# Patient Record
Sex: FEMALE | Race: WHITE | NOT HISPANIC OR LATINO | Employment: FULL TIME | URBAN - METROPOLITAN AREA
[De-identification: names, ages, dates, MRNs, and addresses within clinical notes are randomized per-mention and may not be internally consistent; named-entity substitution may affect disease eponyms.]

---

## 2018-02-15 ENCOUNTER — TRANSFERRED RECORDS (OUTPATIENT)
Dept: HEALTH INFORMATION MANAGEMENT | Facility: CLINIC | Age: 23
End: 2018-02-15

## 2018-02-23 RX ORDER — DROSPIRENONE AND ETHINYL ESTRADIOL 0.02-3(28)
1 KIT ORAL DAILY
COMMUNITY
End: 2018-03-13

## 2018-03-13 ENCOUNTER — OFFICE VISIT (OUTPATIENT)
Dept: OBGYN | Facility: OTHER | Age: 23
End: 2018-03-13
Attending: OBSTETRICS & GYNECOLOGY
Payer: COMMERCIAL

## 2018-03-13 VITALS
SYSTOLIC BLOOD PRESSURE: 105 MMHG | BODY MASS INDEX: 20.09 KG/M2 | OXYGEN SATURATION: 99 % | DIASTOLIC BLOOD PRESSURE: 71 MMHG | WEIGHT: 125 LBS | HEIGHT: 66 IN | HEART RATE: 115 BPM

## 2018-03-13 DIAGNOSIS — R10.2 PELVIC PAIN IN FEMALE: Primary | ICD-10-CM

## 2018-03-13 DIAGNOSIS — N94.6 DYSMENORRHEA: ICD-10-CM

## 2018-03-13 DIAGNOSIS — R30.0 DYSURIA: ICD-10-CM

## 2018-03-13 DIAGNOSIS — N94.10 DYSPAREUNIA, FEMALE: ICD-10-CM

## 2018-03-13 PROCEDURE — 99243 OFF/OP CNSLTJ NEW/EST LOW 30: CPT | Performed by: OBSTETRICS & GYNECOLOGY

## 2018-03-13 RX ORDER — METRONIDAZOLE 500 MG/1
500 TABLET ORAL DAILY PRN
COMMUNITY
Start: 2018-02-03 | End: 2018-05-03

## 2018-03-13 ASSESSMENT — PAIN SCALES - GENERAL: PAINLEVEL: NO PAIN (0)

## 2018-03-13 NOTE — MR AVS SNAPSHOT
After Visit Summary   3/13/2018    Ketty Mercedes    MRN: [id number]           Patient Information     Date Of Birth          1995        Visit Information        Provider Department      3/13/2018 2:00 PM Timoteo Weber MD [facility]        Today's Diagnoses     Pelvic pain in female    -  1    Dysmenorrhea        Dyspareunia, female        Dysuria          Care Instructions     Pt has my card and phone number to call as needed if problems in the interim or she does not hear her results.           Follow-ups after your visit        Your next 10 appointments already scheduled     Mar 20, 2018  1:00 PM CDT   US PELVIC COMPLETE W TRANSVAGINAL with HIUS1   HI ULTRASOUND ([facility] )    [address] [facility] [address]   [phone]           Please bring a list of your medicines (including vitamins, minerals and over-the-counter drugs). Also, tell your doctor about any allergies you may have. Wear comfortable clothes and leave your valuables at home.  Adults: Drink six 8-ounce glasses of fluid one hour before your exam. Do NOT empty your bladder.  If you need to empty your bladder before your exam, try to release only a little bit of urine. Then, drink another 8oz glass of fluid.  Children: Children who are potty trained should drink at least 4 cups (32 oz) of liquid 45 minutes to one hour prior to the exam. The child s bladder must be full in order to achieve a diagnostic exam. If your child is very uncomfortable or has an urgent need to pee, please notify a technologist; they will try to find out how much longer the wait may be and provide instructions to help relieve the pressure. Occasionally it is medically necessary to insert a urinary catheter to fill the bladder.  Please call the Imaging Department at your exam site with any questions.              Who to contact     If you have questions or need follow up information about today's clinic visit or your  "schedule please contact [facility] directly at [phone].  Normal or non-critical lab and imaging results will be communicated to you by MyChart, letter or phone within 4 business days after the clinic has received the results. If you do not hear from us within 7 days, please contact the clinic through MyChart or phone. If you have a critical or abnormal lab result, we will notify you by phone as soon as possible.  Submit refill requests through [employer] or call your pharmacy and they will forward the refill request to us. Please allow 3 business days for your refill to be completed.          Additional Information About Your Visit        [employer] Information     [employer] lets you send messages to your doctor, view your test results, renew your prescriptions, schedule appointments and more. To sign up, go to [email or web address] . Click on \"Log in\" on the left side of the screen, which will take you to the Welcome page. Then click on \"Sign up Now\" on the right side of the page.     You will be asked to enter the access code listed below, as well as some personal information. Please follow the directions to create your username and password.     Your access code is: [email or web address]  Expires: 2018  8:53 PM     Your access code will  in 90 days. If you need help or a new code, please call your [facility] clinic or [phone].        Care EveryWhere ID     This is your Care EveryWhere ID. This could be used by other organizations to access your [facility] medical records  [id number]        Your Vitals Were     Pulse Height Pulse Oximetry BMI (Body Mass Index)          115 5' 6\" (1.676 m) 99% 20.18 kg/m2         Blood Pressure from Last 3 Encounters:   18 105/71   16 130/90   16 109/77    Weight from Last 3 Encounters:   18 125 lb (56.7 kg)               Primary Care Provider Office Phone # Fax #    Rick Lozoya [phone]       [facility] [place] [address] [facility] [address]        Equal Access to Services     BIJALPATO JEIMY : Hadii aad ku hadlakhwindersg Somark, warekhada lualanaadaha, qasrinathta seralitzytahmina casey, naeem acevesjuliánletha valdivia. So [facility] [phone].    ATENCIÓN: Si habla español, tiene a horton disposición servicios gratuitos de asistencia lingüística. Llame al [phone].    We comply with applicable federal civil rights laws and [place] laws. We do not discriminate on the basis of race, color, national origin, age, disability, sex, sexual orientation, or gender identity.            Thank you!     Thank you for choosing [facility]  for your care. Our goal is always to provide you with excellent care. Hearing back from our patients is one way we can continue to improve our services. Please take a few minutes to complete the written survey that you may receive in the mail after your visit with us. Thank you!             Your Updated Medication List - Protect others around you: Learn how to safely use, store and throw away your medicines at www.disposemymeds.org.          This list is accurate as of 3/13/18 11:59 PM.  Always use your most recent med list.                   Brand Name Dispense Instructions for use Diagnosis    metroNIDAZOLE 500 MG tablet    FLAGYL     Take 500 mg by mouth daily as needed Take one tablet morning and night the week before your period starting a couple days  Before symptoms expected.

## 2018-03-13 NOTE — NURSING NOTE
"Chief Complaint   Patient presents with     Consult     Lozoya/ pelvic pain       Initial /71 (BP Location: Left arm, Cuff Size: Adult Regular)  Pulse 115  Ht 5' 6\" (1.676 m)  Wt 125 lb (56.7 kg)  SpO2 99%  BMI 20.18 kg/m2 Estimated body mass index is 20.18 kg/(m^2) as calculated from the following:    Height as of this encounter: 5' 6\" (1.676 m).    Weight as of this encounter: 125 lb (56.7 kg).  Medication Reconciliation: complete     Ale Connor      "

## 2018-03-16 NOTE — PROGRESS NOTES
"Chief Complaint:  Consult from Dr. Lozoya  for Pelvic pain  HPI:  Ketty Mercedes is a 22 year old female is a   Para0.  No LMP recorded. Patient is not currently having periods (Reason: Irregular Periods).  Menses are mothly, lasting 5 days. She describes pelvic pain prior to menses with deep dyspareunia.  She has been treated for recurrent yeast/BV and urinary infections previously. She gets BV prior to menses and has been on prophylactic ABX in the past for recurrent UTI without improvement.  Pain worse with urination.   She has had unprotected intercourse since August without pregnancy.  She has a family h/o endometriosis. Menarche age 14 with dysmenorrhea.  She was placed on Depo-Provera until stopping in 2016.  H/o Chlamydia x 1. Outside records reviewed.   Location: suprapubic  Radiation no  Prior treatment/tests yes    Current contraception None.      Patients records are available and reviewed at today's visit.    Past GYN history:  No STD history and Chlamydia       Last PAP smear:  Normal with negative cervical cx.       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    No family history on file.    Allergies: Cats and Bactrim [sulfamethoxazole w/trimethoprim]    Current Outpatient Prescriptions   Medication Sig Dispense Refill     metroNIDAZOLE (FLAGYL) 500 MG tablet Take 500 mg by mouth daily as needed Take one tablet morning and night the week before your period starting a couple days  Before symptoms expected.           ROS:  CONSTITUTIONAL: NEGATIVE for fever, chills, change in weight  GI: NEGATIVE for nausea, abdominal pain, heartburn, or change in bowel habits  : NEGATIVE Eexcept for above.   PSYCHIATRIC: NEGATIVE for changes in mood or affect    EXAM:  Blood pressure 105/71, pulse 115, height 5' 6\" (1.676 m), " weight 125 lb (56.7 kg), SpO2 99 %.   BMI= Body mass index is 20.18 kg/(m^2).  General - pleasant female in no acute distress.   Neurological -  mental status normal.  Alert and oriented.  Abdomen - soft, nontender, nondistended, no hepatosplenomegaly.  Pelvic - External genitalia: normal adult female, BUS: within normal limits, Vagina: without lesions or  discharge, Cervix: no lesions or CMT, Uterus: firm, normal size, contour, ntender.Adnexae: no masses.  + l adnexal TTP.  + bladder TTP.  Anus without lesions  Rectovaginal - deferred.  Ext:  No edema  Neurological -  mental status normal.  Alert and oriented.  Pap smear done:  No  Cervical cultures:No      ASSESSMENT/PLAN:  Cyclical pelvic pain, dyspareunia, dysuria.  H/o chlamydia and concern for tubal damage/infertility.   Discussed potential etiologies of pelvic pain including endometriosis (which she does have a family history of), IC, adhesions, etc.  Pelvic US ordered to evaluated for pelvic/ovarian pathology.  If negative discussed further w/u with laparoscopy, chromopertubation, cystoscopy.  Handouts on pelvic pain, IC, endometriosis and laparoscopy reviewed with pt.  Will await US results and schedule accordingly.  Reviewed goals, risks, alternatives for proposed  procedure.  Including risk of bleeding, infection, damage to nerves, blood vessels, bowel and bladder. Discussed recovery period and expected discomfort.. All questions were answered  Pt has my card and phone number to call as needed if problems in the interim or she does not hear her results. 45 minutes were spent with the patient with greater than 50% of the visit spent in face-to-face counseling and coordination of care.        Timoteo Weber MD

## 2018-03-16 NOTE — PATIENT INSTRUCTIONS
Pt has my card and phone number to call as needed if problems in the interim or she does not hear her results.

## 2018-03-20 ENCOUNTER — HOSPITAL ENCOUNTER (OUTPATIENT)
Dept: ULTRASOUND IMAGING | Facility: HOSPITAL | Age: 23
Discharge: HOME | End: 2018-03-20
Attending: OBSTETRICS & GYNECOLOGY | Admitting: OBSTETRICS & GYNECOLOGY
Payer: COMMERCIAL

## 2018-03-20 DIAGNOSIS — R10.2 PELVIC PAIN IN FEMALE: ICD-10-CM

## 2018-03-20 PROCEDURE — 76856 US EXAM PELVIC COMPLETE: CPT | Mod: TC

## 2018-04-04 DIAGNOSIS — R10.2 PELVIC PAIN IN FEMALE: Primary | ICD-10-CM

## 2018-04-04 DIAGNOSIS — R30.0 DYSURIA: ICD-10-CM

## 2018-04-04 DIAGNOSIS — N94.10 DYSPAREUNIA IN FEMALE: ICD-10-CM

## 2018-04-11 ENCOUNTER — TRANSFERRED RECORDS (OUTPATIENT)
Dept: HEALTH INFORMATION MANAGEMENT | Facility: CLINIC | Age: 23
End: 2018-04-11

## 2018-04-18 ENCOUNTER — APPOINTMENT (OUTPATIENT)
Dept: LAB | Facility: HOSPITAL | Age: 23
End: 2018-04-18
Attending: OBSTETRICS & GYNECOLOGY
Payer: COMMERCIAL

## 2018-04-18 ENCOUNTER — MEDICAL CORRESPONDENCE (OUTPATIENT)
Dept: HEALTH INFORMATION MANAGEMENT | Facility: CLINIC | Age: 23
End: 2018-04-18

## 2018-04-18 ENCOUNTER — ANESTHESIA EVENT (OUTPATIENT)
Dept: SURGERY | Facility: HOSPITAL | Age: 23
End: 2018-04-18
Payer: COMMERCIAL

## 2018-04-18 ENCOUNTER — HOSPITAL ENCOUNTER (OUTPATIENT)
Facility: HOSPITAL | Age: 23
Discharge: HOME | End: 2018-04-18
Attending: OBSTETRICS & GYNECOLOGY | Admitting: OBSTETRICS & GYNECOLOGY
Payer: COMMERCIAL

## 2018-04-18 ENCOUNTER — SURGERY (OUTPATIENT)
Age: 23
End: 2018-04-18

## 2018-04-18 ENCOUNTER — ANESTHESIA (OUTPATIENT)
Dept: SURGERY | Facility: HOSPITAL | Age: 23
End: 2018-04-18
Payer: COMMERCIAL

## 2018-04-18 VITALS
SYSTOLIC BLOOD PRESSURE: 132 MMHG | BODY MASS INDEX: 19.77 KG/M2 | WEIGHT: 123 LBS | RESPIRATION RATE: 16 BRPM | HEIGHT: 66 IN | TEMPERATURE: 97.4 F | DIASTOLIC BLOOD PRESSURE: 99 MMHG | OXYGEN SATURATION: 100 %

## 2018-04-18 DIAGNOSIS — Z98.890 POST-OPERATIVE STATE: Primary | ICD-10-CM

## 2018-04-18 LAB — HCG UR QL: NEGATIVE

## 2018-04-18 PROCEDURE — 71000014 ZZH RECOVERY PHASE 1 LEVEL 2 FIRST HR: Performed by: OBSTETRICS & GYNECOLOGY

## 2018-04-18 PROCEDURE — 25000125 ZZHC RX 250: Performed by: NURSE ANESTHETIST, CERTIFIED REGISTERED

## 2018-04-18 PROCEDURE — 81025 URINE PREGNANCY TEST: CPT | Performed by: ANESTHESIOLOGY

## 2018-04-18 PROCEDURE — 25000125 ZZHC RX 250: Performed by: OBSTETRICS & GYNECOLOGY

## 2018-04-18 PROCEDURE — 25000132 ZZH RX MED GY IP 250 OP 250 PS 637: Performed by: OBSTETRICS & GYNECOLOGY

## 2018-04-18 PROCEDURE — 49320 DIAG LAPARO SEPARATE PROC: CPT | Performed by: OBSTETRICS & GYNECOLOGY

## 2018-04-18 PROCEDURE — 36000058 ZZH SURGERY LEVEL 3 EA 15 ADDTL MIN: Performed by: OBSTETRICS & GYNECOLOGY

## 2018-04-18 PROCEDURE — 25000128 H RX IP 250 OP 636: Performed by: ANESTHESIOLOGY

## 2018-04-18 PROCEDURE — 58350 REOPEN FALLOPIAN TUBE: CPT | Performed by: OBSTETRICS & GYNECOLOGY

## 2018-04-18 PROCEDURE — 25000128 H RX IP 250 OP 636: Performed by: NURSE ANESTHETIST, CERTIFIED REGISTERED

## 2018-04-18 PROCEDURE — 36000056 ZZH SURGERY LEVEL 3 1ST 30 MIN: Performed by: OBSTETRICS & GYNECOLOGY

## 2018-04-18 PROCEDURE — 25000128 H RX IP 250 OP 636: Performed by: OBSTETRICS & GYNECOLOGY

## 2018-04-18 PROCEDURE — 27210794 ZZH OR GENERAL SUPPLY STERILE: Performed by: OBSTETRICS & GYNECOLOGY

## 2018-04-18 PROCEDURE — 58662 LAPAROSCOPY EXCISE LESIONS: CPT | Performed by: ANESTHESIOLOGY

## 2018-04-18 PROCEDURE — 27110028 ZZH OR GENERAL SUPPLY NON-STERILE: Performed by: OBSTETRICS & GYNECOLOGY

## 2018-04-18 PROCEDURE — 25000125 ZZHC RX 250: Performed by: ANESTHESIOLOGY

## 2018-04-18 PROCEDURE — 25000566 ZZH SEVOFLURANE, EA 15 MIN: Performed by: ANESTHESIOLOGY

## 2018-04-18 PROCEDURE — 01999 UNLISTED ANES PROCEDURE: CPT | Performed by: NURSE ANESTHETIST, CERTIFIED REGISTERED

## 2018-04-18 PROCEDURE — 37000008 ZZH ANESTHESIA TECHNICAL FEE, 1ST 30 MIN: Performed by: OBSTETRICS & GYNECOLOGY

## 2018-04-18 PROCEDURE — 40000305 ZZH STATISTIC PRE PROC ASSESS I: Performed by: OBSTETRICS & GYNECOLOGY

## 2018-04-18 PROCEDURE — 37000009 ZZH ANESTHESIA TECHNICAL FEE, EACH ADDTL 15 MIN: Performed by: OBSTETRICS & GYNECOLOGY

## 2018-04-18 PROCEDURE — 71000027 ZZH RECOVERY PHASE 2 EACH 15 MINS: Performed by: OBSTETRICS & GYNECOLOGY

## 2018-04-18 PROCEDURE — 25000132 ZZH RX MED GY IP 250 OP 250 PS 637

## 2018-04-18 RX ORDER — ONDANSETRON 2 MG/ML
4 INJECTION INTRAMUSCULAR; INTRAVENOUS EVERY 30 MIN PRN
Status: DISCONTINUED | OUTPATIENT
Start: 2018-04-18 | End: 2018-04-18 | Stop reason: HOSPADM

## 2018-04-18 RX ORDER — FENTANYL CITRATE 50 UG/ML
25-50 INJECTION, SOLUTION INTRAMUSCULAR; INTRAVENOUS
Status: DISCONTINUED | OUTPATIENT
Start: 2018-04-18 | End: 2018-04-18 | Stop reason: HOSPADM

## 2018-04-18 RX ORDER — MEPERIDINE HYDROCHLORIDE 25 MG/ML
12.5 INJECTION INTRAMUSCULAR; INTRAVENOUS; SUBCUTANEOUS
Status: DISCONTINUED | OUTPATIENT
Start: 2018-04-18 | End: 2018-04-18 | Stop reason: HOSPADM

## 2018-04-18 RX ORDER — GLYCOPYRROLATE 0.2 MG/ML
INJECTION, SOLUTION INTRAMUSCULAR; INTRAVENOUS PRN
Status: DISCONTINUED | OUTPATIENT
Start: 2018-04-18 | End: 2018-04-18

## 2018-04-18 RX ORDER — DOXYCYCLINE 100 MG/10ML
100 INJECTION, POWDER, LYOPHILIZED, FOR SOLUTION INTRAVENOUS
Status: DISCONTINUED | OUTPATIENT
Start: 2018-04-18 | End: 2018-04-18

## 2018-04-18 RX ORDER — SODIUM CHLORIDE, SODIUM LACTATE, POTASSIUM CHLORIDE, CALCIUM CHLORIDE 600; 310; 30; 20 MG/100ML; MG/100ML; MG/100ML; MG/100ML
INJECTION, SOLUTION INTRAVENOUS CONTINUOUS
Status: DISCONTINUED | OUTPATIENT
Start: 2018-04-18 | End: 2018-04-18 | Stop reason: HOSPADM

## 2018-04-18 RX ORDER — KETOROLAC TROMETHAMINE 30 MG/ML
30 INJECTION, SOLUTION INTRAMUSCULAR; INTRAVENOUS EVERY 6 HOURS PRN
Status: DISCONTINUED | OUTPATIENT
Start: 2018-04-18 | End: 2018-04-18 | Stop reason: HOSPADM

## 2018-04-18 RX ORDER — NALOXONE HYDROCHLORIDE 0.4 MG/ML
.1-.4 INJECTION, SOLUTION INTRAMUSCULAR; INTRAVENOUS; SUBCUTANEOUS
Status: DISCONTINUED | OUTPATIENT
Start: 2018-04-18 | End: 2018-04-18 | Stop reason: HOSPADM

## 2018-04-18 RX ORDER — DEXAMETHASONE SODIUM PHOSPHATE 10 MG/ML
INJECTION, SOLUTION INTRAMUSCULAR; INTRAVENOUS PRN
Status: DISCONTINUED | OUTPATIENT
Start: 2018-04-18 | End: 2018-04-18

## 2018-04-18 RX ORDER — HYDROCODONE BITARTRATE AND ACETAMINOPHEN 5; 325 MG/1; MG/1
TABLET ORAL
Status: COMPLETED
Start: 2018-04-18 | End: 2018-04-18

## 2018-04-18 RX ORDER — PROMETHAZINE HYDROCHLORIDE 25 MG/ML
12.5 INJECTION, SOLUTION INTRAMUSCULAR; INTRAVENOUS
Status: DISCONTINUED | OUTPATIENT
Start: 2018-04-18 | End: 2018-04-18 | Stop reason: HOSPADM

## 2018-04-18 RX ORDER — SCOLOPAMINE TRANSDERMAL SYSTEM 1 MG/1
1 PATCH, EXTENDED RELEASE TRANSDERMAL ONCE
Status: COMPLETED | OUTPATIENT
Start: 2018-04-18 | End: 2018-04-18

## 2018-04-18 RX ORDER — PROPOFOL 10 MG/ML
INJECTION, EMULSION INTRAVENOUS PRN
Status: DISCONTINUED | OUTPATIENT
Start: 2018-04-18 | End: 2018-04-18

## 2018-04-18 RX ORDER — LABETALOL HYDROCHLORIDE 5 MG/ML
10 INJECTION, SOLUTION INTRAVENOUS
Status: DISCONTINUED | OUTPATIENT
Start: 2018-04-18 | End: 2018-04-18 | Stop reason: HOSPADM

## 2018-04-18 RX ORDER — KETOROLAC TROMETHAMINE 30 MG/ML
30 INJECTION, SOLUTION INTRAMUSCULAR; INTRAVENOUS ONCE
Status: COMPLETED | OUTPATIENT
Start: 2018-04-18 | End: 2018-04-18

## 2018-04-18 RX ORDER — FENTANYL CITRATE 50 UG/ML
INJECTION, SOLUTION INTRAMUSCULAR; INTRAVENOUS PRN
Status: DISCONTINUED | OUTPATIENT
Start: 2018-04-18 | End: 2018-04-18

## 2018-04-18 RX ORDER — OXYCODONE HYDROCHLORIDE 5 MG/1
5 TABLET ORAL EVERY 4 HOURS PRN
Status: DISCONTINUED | OUTPATIENT
Start: 2018-04-18 | End: 2018-04-18 | Stop reason: HOSPADM

## 2018-04-18 RX ORDER — IBUPROFEN 800 MG/1
800 TABLET, FILM COATED ORAL EVERY 8 HOURS PRN
Qty: 40 TABLET | Refills: 1 | Status: SHIPPED | OUTPATIENT
Start: 2018-04-18

## 2018-04-18 RX ORDER — HYDROCODONE BITARTRATE AND ACETAMINOPHEN 5; 325 MG/1; MG/1
1-2 TABLET ORAL EVERY 4 HOURS PRN
Qty: 28 TABLET | Refills: 0 | Status: SHIPPED | OUTPATIENT
Start: 2018-04-18 | End: 2018-05-03

## 2018-04-18 RX ORDER — ALBUTEROL SULFATE 0.83 MG/ML
2.5 SOLUTION RESPIRATORY (INHALATION) EVERY 4 HOURS PRN
Status: DISCONTINUED | OUTPATIENT
Start: 2018-04-18 | End: 2018-04-18 | Stop reason: HOSPADM

## 2018-04-18 RX ORDER — HYDROMORPHONE HYDROCHLORIDE 1 MG/ML
.3-.5 INJECTION, SOLUTION INTRAMUSCULAR; INTRAVENOUS; SUBCUTANEOUS EVERY 10 MIN PRN
Status: DISCONTINUED | OUTPATIENT
Start: 2018-04-18 | End: 2018-04-18 | Stop reason: HOSPADM

## 2018-04-18 RX ORDER — ONDANSETRON 2 MG/ML
INJECTION INTRAMUSCULAR; INTRAVENOUS PRN
Status: DISCONTINUED | OUTPATIENT
Start: 2018-04-18 | End: 2018-04-18

## 2018-04-18 RX ORDER — HYDRALAZINE HYDROCHLORIDE 20 MG/ML
2.5-5 INJECTION INTRAMUSCULAR; INTRAVENOUS EVERY 10 MIN PRN
Status: DISCONTINUED | OUTPATIENT
Start: 2018-04-18 | End: 2018-04-18 | Stop reason: HOSPADM

## 2018-04-18 RX ORDER — DEXAMETHASONE SODIUM PHOSPHATE 4 MG/ML
4 INJECTION, SOLUTION INTRA-ARTICULAR; INTRALESIONAL; INTRAMUSCULAR; INTRAVENOUS; SOFT TISSUE EVERY 10 MIN PRN
Status: DISCONTINUED | OUTPATIENT
Start: 2018-04-18 | End: 2018-04-18 | Stop reason: HOSPADM

## 2018-04-18 RX ORDER — HYDROCODONE BITARTRATE AND ACETAMINOPHEN 5; 325 MG/1; MG/1
1 TABLET ORAL EVERY 6 HOURS PRN
Status: DISCONTINUED | OUTPATIENT
Start: 2018-04-18 | End: 2018-04-18 | Stop reason: HOSPADM

## 2018-04-18 RX ORDER — PHENAZOPYRIDINE HYDROCHLORIDE 100 MG/1
200 TABLET, FILM COATED ORAL ONCE
Status: COMPLETED | OUTPATIENT
Start: 2018-04-18 | End: 2018-04-18

## 2018-04-18 RX ORDER — ONDANSETRON 4 MG/1
4 TABLET, ORALLY DISINTEGRATING ORAL EVERY 30 MIN PRN
Status: DISCONTINUED | OUTPATIENT
Start: 2018-04-18 | End: 2018-04-18 | Stop reason: HOSPADM

## 2018-04-18 RX ORDER — LIDOCAINE HYDROCHLORIDE 20 MG/ML
INJECTION, SOLUTION INFILTRATION; PERINEURAL PRN
Status: DISCONTINUED | OUTPATIENT
Start: 2018-04-18 | End: 2018-04-18

## 2018-04-18 RX ADMIN — ONDANSETRON 4 MG: 2 INJECTION INTRAMUSCULAR; INTRAVENOUS at 12:53

## 2018-04-18 RX ADMIN — MIDAZOLAM 2 MG: 1 INJECTION INTRAMUSCULAR; INTRAVENOUS at 12:53

## 2018-04-18 RX ADMIN — HYDROCODONE BITARTRATE AND ACETAMINOPHEN 1 TABLET: 5; 325 TABLET ORAL at 14:59

## 2018-04-18 RX ADMIN — Medication 100 MG: at 12:58

## 2018-04-18 RX ADMIN — DOXYCYCLINE 100 MG: 100 INJECTION, POWDER, LYOPHILIZED, FOR SOLUTION INTRAVENOUS at 13:06

## 2018-04-18 RX ADMIN — SODIUM CHLORIDE, POTASSIUM CHLORIDE, SODIUM LACTATE AND CALCIUM CHLORIDE: 600; 310; 30; 20 INJECTION, SOLUTION INTRAVENOUS at 10:28

## 2018-04-18 RX ADMIN — METHYLENE BLUE 10 ML: 10 INJECTION INTRAVENOUS at 13:40

## 2018-04-18 RX ADMIN — GLYCOPYRROLATE 0.2 MG: 0.2 INJECTION, SOLUTION INTRAMUSCULAR; INTRAVENOUS at 13:23

## 2018-04-18 RX ADMIN — DEXAMETHASONE SODIUM PHOSPHATE 10 MG: 10 INJECTION, SOLUTION INTRAMUSCULAR; INTRAVENOUS at 13:06

## 2018-04-18 RX ADMIN — FENTANYL CITRATE 100 MCG: 50 INJECTION, SOLUTION INTRAMUSCULAR; INTRAVENOUS at 12:53

## 2018-04-18 RX ADMIN — FENTANYL CITRATE 50 MCG: 50 INJECTION, SOLUTION INTRAMUSCULAR; INTRAVENOUS at 14:50

## 2018-04-18 RX ADMIN — SCOPALAMINE 1 PATCH: 1 PATCH, EXTENDED RELEASE TRANSDERMAL at 10:26

## 2018-04-18 RX ADMIN — KETOROLAC TROMETHAMINE 30 MG: 30 INJECTION, SOLUTION INTRAMUSCULAR at 10:27

## 2018-04-18 RX ADMIN — LIDOCAINE HYDROCHLORIDE 40 MG: 20 INJECTION, SOLUTION INFILTRATION; PERINEURAL at 12:58

## 2018-04-18 RX ADMIN — FENTANYL CITRATE 50 MCG: 50 INJECTION INTRAMUSCULAR; INTRAVENOUS at 14:15

## 2018-04-18 RX ADMIN — PHENAZOPYRIDINE HYDROCHLORIDE 200 MG: 100 TABLET, COATED ORAL at 14:42

## 2018-04-18 RX ADMIN — PROPOFOL 150 MG: 10 INJECTION, EMULSION INTRAVENOUS at 12:58

## 2018-04-18 RX ADMIN — FENTANYL CITRATE 50 MCG: 50 INJECTION, SOLUTION INTRAMUSCULAR; INTRAVENOUS at 15:12

## 2018-04-18 ASSESSMENT — PAIN DESCRIPTION - DESCRIPTORS: DESCRIPTORS: CRAMPING

## 2018-04-18 NOTE — ANESTHESIA PREPROCEDURE EVALUATION
Anesthesia Evaluation     . Pt has had prior anesthetic.     No history of anesthetic complications          ROS/MED HX    ENT/Pulmonary:  - neg pulmonary ROS     Neurologic:     (+)migraines,     Cardiovascular:  - neg cardiovascular ROS       METS/Exercise Tolerance:     Hematologic:  - neg hematologic  ROS       Musculoskeletal:  - neg musculoskeletal ROS       GI/Hepatic:  - neg GI/hepatic ROS       Renal/Genitourinary:     (+) Other Renal/ Genitourinary, PELVIC PAIN IN FEMALE, DYSPAREUNIA, DYSURIA      Endo:  - neg endo ROS       Psychiatric:  - neg psychiatric ROS       Infectious Disease:  - neg infectious disease ROS       Malignancy:      - no malignancy   Other:    (+) No chance of pregnancy   - neg other ROS                 Physical Exam  Normal systems: dental    Airway   Mallampati: II  TM distance: >3 FB  Neck ROM: full    Dental     Cardiovascular   Rhythm and rate: regular and normal      Pulmonary    breath sounds clear to auscultation                    Anesthesia Plan      History & Physical Review  History and physical reviewed and following examination; no interval change.    ASA Status:  2 .    NPO Status:  > 8 hours    Plan for General, ETT and Periph. Nerve Block for postop pain with Intravenous and Propofol induction. Maintenance will be Balanced.    PONV prophylaxis:  Ondansetron (or other 5HT-3), Scopolamine patch and Dexamethasone or Solumedrol  HCG Negative      Postoperative Care  Postoperative pain management:  IV analgesics, Oral pain medications and Peripheral nerve block (Single Shot).      Consents  Anesthetic plan, risks, benefits and alternatives discussed with:  Patient..                          .

## 2018-04-18 NOTE — OR NURSING
Patient and responsible adult given discharge instructions with no questions regarding instructions. Compa score 19. Pain level 4/10.  Discharged from unit via wheelchair. Patient discharged to home with mother.

## 2018-04-18 NOTE — IP AVS SNAPSHOT
MRN:[id number]                      After Visit Summary   4/18/2018    Ketty Mercedes    MRN: [id number]           Thank you!     Thank you for choosing [facility] for your care. Our goal is always to provide you with excellent care. Hearing back from our patients is one way we can continue to improve our services. Please take a few minutes to complete the written survey that you may receive in the mail after you visit with us. Thank you!        Patient Information     Date Of Birth          1995        About your hospital stay     You were admitted on:  April 18, 2018 You last received care in the:  HI Preop/Phase II    You were discharged on:  April 18, 2018       Who to Call     For medical emergencies, please call 911.  For non-urgent questions about your medical care, please call your primary care provider or clinic, [phone]  For questions related to your surgery, please call your surgery clinic        Attending Provider     Provider Specialty    Timoteo Weber MD OB/Gyn       Primary Care Provider Office Phone # Fax #    Rick IRISH Lozoya [phone]      Your next 10 appointments already scheduled     May 03, 2018  1:45 PM CDT   (Arrive by 1:30 PM)   Post Op with Timoteo Weber MD   [facility] ([facility] )    [address] [place] [address]   [phone]              Further instructions from your care team         Call MD prior to DC.  No driving today or while on pain meds  Pelvic rest for 2 weeks  Schedule PO appt 2 weeks  Call Dr. Weber [phone] as necessary if problems in interim.  No heavy lifting, vigorous activity, swim, bath, exercise for 1 week        Post-Anesthesia Patient Instructions    IMMEDIATELY FOLLOWING SURGERY:  Do not drive or operate machinery for the first twenty four hours after surgery.  Do not make any important decisions for twenty four hours after surgery or while taking narcotic pain  "medications or sedatives.  If you develop intractable nausea and vomiting or a severe headache please notify your doctor immediately.    FOLLOW-UP:  Please make an appointment with your surgeon as instructed. You do not need to follow up with anesthesia unless specifically instructed to do so.    WOUND CARE INSTRUCTIONS (if applicable):  Keep a dry clean dressing on the anesthesia/puncture wound site if there is drainage.  Once the wound has quit draining you may leave it open to air.  Generally you should leave the bandage intact for twenty four hours unless there is drainage.  If the epidural site drains for more than 36-48 hours please call the anesthesia department.    QUESTIONS?:  Please feel free to call your physician or the hospital  if you have any questions, and they will be happy to assist you.      Remove the scopolamine patch behind your left ear after 24 hours after application.   After removing the patch, wash your hands and the area behind your ear thoroughly with soap and water.   The patch will still contain some medicine after use.   To avoid accidental contact or ingestion by children or pets, fold the used patch in half with the sticky side together and throw away in the trash out of the reach of children and pets.            Pending Results     No orders found from 4/16/2018 to 4/19/2018.            Admission Information     Date & Time Provider Department Dept. Phone    4/18/2018 Timoteo Weber MD HI Preop/Phase [phone]      Your Vitals Were     Blood Pressure Temperature Respirations Height Weight Last Period    132/99 98.5  F (36.9  C) (Temporal) 16 1.676 m (5' 6\") 55.8 kg (123 lb) (LMP Unknown)    Pulse Oximetry BMI (Body Mass Index)                100% 19.85 kg/m2          [employer] Information     [employer] lets you send messages to your doctor, view your test results, renew your prescriptions, schedule appointments and more. To sign up, go to [email or web address] . Click " "on \"Log in\" on the left side of the screen, which will take you to the Welcome page. Then click on \"Sign up Now\" on the right side of the page.     You will be asked to enter the access code listed below, as well as some personal information. Please follow the directions to create your username and password.     Your access code is: [email or web address]  Expires: 2018  8:53 PM     Your access code will  in 90 days. If you need help or a new code, please call your [facility] clinic or [phone].        Care EveryWhere ID     This is your Care EveryWhere ID. This could be used by other organizations to access your [facility] medical records  [id number]        Equal Access to Services     RASHAUN MUNSON : Jailene Boston, wademond dunn, alfred kaalrocio casey, naeem valdivia. So [facility] [phone].    ATENCIÓN: Si habla español, tiene a horton disposición servicios gratuitos de asistencia lingüística. Ll[facility] [phone].    We comply with applicable federal civil rights laws and [place] laws. We do not discriminate on the basis of race, color, national origin, age, disability, sex, sexual orientation, or gender identity.               Review of your medicines      START taking        Dose / Directions    HYDROcodone-acetaminophen 5-325 MG per tablet   Commonly known as:  NORCO   Used for:  Post-operative state        Dose:  1-2 tablet   Take 1-2 tablets by mouth every 4 hours as needed for pain maximum 10 tablet(s) per day   Quantity:  28 tablet   Refills:  0       ibuprofen 800 MG tablet   Commonly known as:  ADVIL/MOTRIN   Used for:  Post-operative state        Dose:  800 mg   Take 1 tablet (800 mg) by mouth every 8 hours as needed for moderate pain   Quantity:  40 tablet   Refills:  1         CONTINUE these medicines which have NOT CHANGED        Dose / Directions    metroNIDAZOLE 500 MG tablet   Commonly known as:  FLAGYL        Dose:  500 mg   Take 500 mg by mouth " daily as needed Take one tablet morning and night the week before your period starting a couple days  Before symptoms expected.   Refills:  0         STOP taking     acetaminophen-caffeine 500-65 MG Tabs   Commonly known as:  EXCEDRIN TENSION HEADACHE                Where to get your medicines      These medications were sent to [id number] - [place], [place] - [address] AT [facility] [address], [place] [address]     Phone:  [phone]     ibuprofen 800 MG tablet         Some of these will need a paper prescription and others can be bought over the counter. Ask your nurse if you have questions.     Bring a paper prescription for each of these medications     HYDROcodone-acetaminophen 5-325 MG per tablet                Protect others around you: Learn how to safely use, store and throw away your medicines at www.disposemymeds.org.        Information about OPIOIDS     PRESCRIPTION OPIOIDS: WHAT YOU NEED TO KNOW   You have a prescription for an opioid (narcotic) pain medicine. Opioids can cause addiction. If you have a history of chemical dependency of any type, you are at a higher risk of becoming addicted to opioids. Only take this medicine after all other options have been tried. Take it for as short a time and as few doses as possible.     Do not:    Drive. If you drive while taking these medicines, you could be arrested for driving under the influence (DUI).    Operate heavy machinery    Do any other dangerous activities while taking these medicines.     Drink any alcohol while taking these medicines.      Take with any other medicines that contain acetaminophen. Read all labels carefully. Look for the word  acetaminophen  or  Tylenol.  Ask your pharmacist if you have questions or are unsure.    Store your pills in a secure place, locked if possible. We will not replace any lost or stolen medicine. If you don t finish your medicine, please throw away (dispose) as directed by  your pharmacist. The [place] Pollution Control Agency has more information about safe disposal: https://www.pca.[facility].[place].us/living-green/managing-unwanted-medications    All opioids tend to cause constipation. Drink plenty of water and eat foods that have a lot of fiber, such as fruits, vegetables, prune juice, apple juice and high-fiber cereal. Take a laxative (Miralax, milk of magnesia, Colace, Senna) if you don t move your bowels at least every other day.              Medication List: This is a list of all your medications and when to take them. Check marks below indicate your daily home schedule. Keep this list as a reference.      Medications           Morning Afternoon Evening Bedtime As Needed    HYDROcodone-acetaminophen 5-325 MG per tablet   Commonly known as:  NORCO   Take 1-2 tablets by mouth every 4 hours as needed for pain maximum 10 tablet(s) per day   Last time this was given:  1 tablet on 4/18/2018  2:59 PM                                ibuprofen 800 MG tablet   Commonly known as:  ADVIL/MOTRIN   Take 1 tablet (800 mg) by mouth every 8 hours as needed for moderate pain                                metroNIDAZOLE 500 MG tablet   Commonly known as:  FLAGYL   Take 500 mg by mouth daily as needed Take one tablet morning and night the week before your period starting a couple days  Before symptoms expected.

## 2018-04-18 NOTE — OP NOTE
[facility]  Operative Note    Pre-operative diagnosis: PELVIC PAIN IN FEMALE, DYSPAREUNIA, DYSURIA   Post-operative diagnosis Suspected Interstitial Cystitis, Bilateral patent fallopian tubes, Normal Laparoscopy   Procedure: Diagnostic Laparoscopy, Chromopertubation, Cystoscopy with Hydrodistension.     Surgeon:  Assistant: Timoteo Weber MD  None     Anesthesia: General    Estimated blood loss: Minimal   Blood transfusion: No transfusion was given during surgery   Drains: None   Specimens: None   Findings: On cystoscopy initial bladder mucosa appearance was normal.  Bilateral ureteral jets noted.  After hydrodistension there was diffuse petechial hemorrhaging of the bladder mucosa.  On laparoscopy there was  normal pelvic and abdominal anatomy and bilaterally patent tubes on chromopertubation.  .     Complications: None   Condition: Stable       OPERATIVE NARRATION: The patient was brought to the Operating Room and uneventfully placed under general anesthesia. She was prepped and draped in the dorsal lithotomy position and her bladder drained. The cervix was visualized with a speculum and grasped anteriorly with a fine tooth tenaculum and a uterine manipulating device placed. We then changed gloves and proceeded to the abdominal portion of the case. A 5 mm infraumbilical skin incision was performed with a scalpel. A Veress needle was introduced without difficulty and a water drop test performed. The abdomen was insufflated with several liters of carbon dioxide. A 5 mm trocar and a laparoscope were introduced. Visualization was excellent. Next, a 5 mm suprapubic and port was placed under direct visualization.  The uterus was elevated and abdominal and pelvic exploration was performed with findings as described above.  Diluted methylene blue was then injected through the uterine manipulating device and chromopertubation performed with findings as above.  At this point there was excellent hemostasis,  the pelvis was irrigated,  and we proceeded to closure. The  trocars were removed and excess carbon dioxide expressed from the abdomen. The subcutaneous spaces were irrigated and checked for hemostasis. The skin incisions were closed with surgical glue. The uterine manipulating devise was removed.   Cystoscopy was then performed using a 70-degree rigid cystoscope with normal saline as distention media. Visualization was excellent. Bilateral ureteral jets were noted. There is no evidence of bladder perforation. Hydrodistension was performed by  installing 500 cc NS and after 5 minutes draining the  bladder and re performing cystoscopy.  Findings as described above.   The cystoscope was withdrawn and the bladder drained.  There were no complications. The patient was transferred to the Recovery Room in excellent and stable condition.     MADHU GARCIA MD

## 2018-04-18 NOTE — ANESTHESIA CARE TRANSFER NOTE
Patient: Ketty Mercedes    Procedure(s):  LAPAROSCOPY, CHROMOPERTUBATION, CYSTOSCOPY - Wound Class: II-Clean Contaminated   - Wound Class: II-Clean Contaminated    Diagnosis: PELVIC PAIN IN FEMALE, DYSPAREUNIA, DYSURIA  Diagnosis Additional Information: No value filed.    Anesthesia Type:   General, ETT, Periph. Nerve Block for postop pain     Note:  Airway :Room Air  Patient transferred to:PACU  Handoff Report: Identifed the Patient, Identified the Reponsible Provider, Reviewed the pertinent medical history, Discussed the surgical course, Reviewed Intra-OP anesthesia mangement and issues during anesthesia, Set expectations for post-procedure period and Allowed opportunity for questions and acknowledgement of understanding      Vitals: (Last set prior to Anesthesia Care Transfer)    CRNA VITALS  4/18/2018 1324 - 4/18/2018 1424      4/18/2018             Pulse: 108    SpO2: 100 %    Resp Rate (observed): 14                Electronically Signed By: ANNIKA Serrato CRNA  April 18, 2018  2:48 PM

## 2018-04-18 NOTE — OR NURSING
Patient awake, alert. Received fentanyl 50 mcg IV for pain with decrease in incisional pain. Report given to Corin VERDIN

## 2018-04-18 NOTE — DISCHARGE INSTRUCTIONS
Call MD prior to DC.  No driving today or while on pain meds  Pelvic rest for 2 weeks  Schedule PO appt 2 weeks  Call Dr. Weber [phone] as necessary if problems in interim.  No heavy lifting, vigorous activity, swim, bath, exercise for 1 week        Post-Anesthesia Patient Instructions    IMMEDIATELY FOLLOWING SURGERY:  Do not drive or operate machinery for the first twenty four hours after surgery.  Do not make any important decisions for twenty four hours after surgery or while taking narcotic pain medications or sedatives.  If you develop intractable nausea and vomiting or a severe headache please notify your doctor immediately.    FOLLOW-UP:  Please make an appointment with your surgeon as instructed. You do not need to follow up with anesthesia unless specifically instructed to do so.    WOUND CARE INSTRUCTIONS (if applicable):  Keep a dry clean dressing on the anesthesia/puncture wound site if there is drainage.  Once the wound has quit draining you may leave it open to air.  Generally you should leave the bandage intact for twenty four hours unless there is drainage.  If the epidural site drains for more than 36-48 hours please call the anesthesia department.    QUESTIONS?:  Please feel free to call your physician or the hospital  if you have any questions, and they will be happy to assist you.      Remove the scopolamine patch behind your left ear after 24 hours after application.   After removing the patch, wash your hands and the area behind your ear thoroughly with soap and water.   The patch will still contain some medicine after use.   To avoid accidental contact or ingestion by children or pets, fold the used patch in half with the sticky side together and throw away in the trash out of the reach of children and pets.

## 2018-04-18 NOTE — ANESTHESIA POSTPROCEDURE EVALUATION
Patient: Ketty Mercedes    Procedure(s):  LAPAROSCOPY, CHROMOPERTUBATION, CYSTOSCOPY - Wound Class: II-Clean Contaminated   - Wound Class: II-Clean Contaminated    Diagnosis:PELVIC PAIN IN FEMALE, DYSPAREUNIA, DYSURIA  Diagnosis Additional Information: No value filed.    Anesthesia Type:  General, ETT, Periph. Nerve Block for postop pain    Note:  Anesthesia Post Evaluation    Patient location during evaluation: Phase 2, PACU and Bedside  Patient participation: Able to fully participate in evaluation  Level of consciousness: awake and alert  Pain management: adequate  Airway patency: patent  Cardiovascular status: acceptable  Respiratory status: acceptable  Hydration status: stable  PONV: none     Anesthetic complications: None          Last vitals:  Vitals:    04/18/18 1505 04/18/18 1510 04/18/18 1515   BP:   132/99   Resp:   16   Temp:   97.4  F (36.3  C)   SpO2: 100% 99% 100%         Electronically Signed By: Angel Bustillo MD  April 18, 2018  6:37 PM

## 2018-04-18 NOTE — IP AVS SNAPSHOT
[facility] Preop/Phase II    [address] [address]    Phone:  [phone]                                       After Visit Summary   4/18/2018    Ketty Mercedes    MRN: [id number]           After Visit Summary Signature Page     I have received my discharge instructions, and my questions have been answered. I have discussed any challenges I see with this plan with the nurse or doctor.    ..........................................................................................................................................  Patient/Patient Representative Signature      ..........................................................................................................................................  Patient Representative Print Name and Relationship to Patient    ..................................................               ................................................  Date                                            Time    ..........................................................................................................................................  Reviewed by Signature/Title    ...................................................              ..............................................  Date                                                            Time

## 2018-05-03 ENCOUNTER — OFFICE VISIT (OUTPATIENT)
Dept: OBGYN | Facility: OTHER | Age: 23
End: 2018-05-03
Attending: OBSTETRICS & GYNECOLOGY
Payer: COMMERCIAL

## 2018-05-03 VITALS
HEIGHT: 66 IN | HEART RATE: 115 BPM | OXYGEN SATURATION: 99 % | WEIGHT: 123 LBS | TEMPERATURE: 98.8 F | SYSTOLIC BLOOD PRESSURE: 109 MMHG | BODY MASS INDEX: 19.77 KG/M2 | DIASTOLIC BLOOD PRESSURE: 63 MMHG

## 2018-05-03 DIAGNOSIS — R10.2 PELVIC PAIN IN FEMALE: ICD-10-CM

## 2018-05-03 DIAGNOSIS — R30.0 DYSURIA: Primary | ICD-10-CM

## 2018-05-03 LAB
ALBUMIN UR-MCNC: NEGATIVE MG/DL
APPEARANCE UR: CLEAR
BACTERIA #/AREA URNS HPF: ABNORMAL /HPF
BILIRUB UR QL STRIP: NEGATIVE
COLOR UR AUTO: ABNORMAL
GLUCOSE UR STRIP-MCNC: NEGATIVE MG/DL
HGB UR QL STRIP: NEGATIVE
KETONES UR STRIP-MCNC: NEGATIVE MG/DL
LEUKOCYTE ESTERASE UR QL STRIP: NEGATIVE
MUCOUS THREADS #/AREA URNS LPF: PRESENT /LPF
NITRATE UR QL: NEGATIVE
PH UR STRIP: 5.5 PH (ref 4.7–8)
RBC #/AREA URNS AUTO: 0 /HPF (ref 0–2)
SOURCE: ABNORMAL
SP GR UR STRIP: 1.01 (ref 1–1.03)
SQUAMOUS #/AREA URNS AUTO: 3 /HPF (ref 0–1)
UROBILINOGEN UR STRIP-MCNC: NORMAL MG/DL (ref 0–2)
WBC #/AREA URNS AUTO: 2 /HPF (ref 0–5)

## 2018-05-03 PROCEDURE — 99024 POSTOP FOLLOW-UP VISIT: CPT | Performed by: OBSTETRICS & GYNECOLOGY

## 2018-05-03 PROCEDURE — 81001 URINALYSIS AUTO W/SCOPE: CPT | Performed by: OBSTETRICS & GYNECOLOGY

## 2018-05-03 ASSESSMENT — PAIN SCALES - GENERAL: PAINLEVEL: NO PAIN (0)

## 2018-05-03 NOTE — PROGRESS NOTES
"JAME Mercedes is a 22 year old female presents for post operative check. She is  2  week(s) status post Laparoscopy.  She reports doing well and denies significant pain or bleeding.  Bowel function is satisfactory. + dysuria. Denies incisional problems. Significant findings Nml pelvis, suspected IC    O.  Blood pressure 109/63, pulse 115, temperature 98.8  F (37.1  C), temperature source Tympanic, height 5' 6\" (1.676 m), weight 123 lb (55.8 kg), SpO2 99 %.    Abd: soft, non-tender, non-distended. Incision clear, dry, and intact without evidence of infection.    A.& P. Satisfactory post-op check.Released from restrictions.    Suspected IC/Pelvic floor dysfunction.  Recommend urogynecology referral.  Discussed referral options with pt.  She will check with insurance and call when she decides where she would like to go and we will initiate referral.  If needs pelvic floor PT can do here.  Pt has my card and phone number to call as needed if problems in the interim or she does not hear her results.     Follow up:  prn problems or at next annual examination.    Timoteo Weber MD  "

## 2018-05-03 NOTE — NURSING NOTE
"Chief Complaint   Patient presents with     Post-op Visit     diag lap chromopertubation on 4/18/18       Initial /63 (BP Location: Left arm, Cuff Size: Adult Regular)  Pulse 115  Temp 98.8  F (37.1  C) (Tympanic)  Ht 5' 6\" (1.676 m)  Wt 123 lb (55.8 kg)  LMP  (LMP Unknown)  SpO2 99%  BMI 19.85 kg/m2 Estimated body mass index is 19.85 kg/(m^2) as calculated from the following:    Height as of this encounter: 5' 6\" (1.676 m).    Weight as of this encounter: 123 lb (55.8 kg).  Medication Reconciliation: vince Connor      "

## 2020-03-02 ENCOUNTER — HEALTH MAINTENANCE LETTER (OUTPATIENT)
Age: 25
End: 2020-03-02

## 2020-07-28 ENCOUNTER — TRANSFERRED RECORDS (OUTPATIENT)
Dept: HEALTH INFORMATION MANAGEMENT | Facility: HOSPITAL | Age: 25
End: 2020-07-28

## 2020-07-28 LAB
HEP C HIM: NORMAL
HIV 1&2 EXT: NORMAL

## 2020-12-14 ENCOUNTER — HEALTH MAINTENANCE LETTER (OUTPATIENT)
Age: 25
End: 2020-12-14

## 2021-03-03 ENCOUNTER — TRANSFERRED RECORDS (OUTPATIENT)
Dept: HEALTH INFORMATION MANAGEMENT | Facility: HOSPITAL | Age: 26
End: 2021-03-03

## 2021-03-03 LAB
HPV ABSTRACT: NORMAL
PAP-ABSTRACT: NORMAL

## 2021-04-18 ENCOUNTER — HEALTH MAINTENANCE LETTER (OUTPATIENT)
Age: 26
End: 2021-04-18

## 2021-10-03 ENCOUNTER — HEALTH MAINTENANCE LETTER (OUTPATIENT)
Age: 26
End: 2021-10-03

## 2022-03-07 ENCOUNTER — LAB REQUISITION (OUTPATIENT)
Dept: LAB | Facility: CLINIC | Age: 27
End: 2022-03-07

## 2022-03-07 DIAGNOSIS — Z03.818 ENCOUNTER FOR OBSERVATION FOR SUSPECTED EXPOSURE TO OTHER BIOLOGICAL AGENTS RULED OUT: ICD-10-CM

## 2022-03-07 LAB — SARS-COV-2 RNA RESP QL NAA+PROBE: POSITIVE

## 2022-03-07 PROCEDURE — U0005 INFEC AGEN DETEC AMPLI PROBE: HCPCS | Performed by: NURSE PRACTITIONER

## 2022-05-14 ENCOUNTER — HEALTH MAINTENANCE LETTER (OUTPATIENT)
Age: 27
End: 2022-05-14

## 2022-07-21 PROCEDURE — 86850 RBC ANTIBODY SCREEN: CPT | Performed by: ADVANCED PRACTICE MIDWIFE

## 2022-07-21 PROCEDURE — 87086 URINE CULTURE/COLONY COUNT: CPT | Performed by: ADVANCED PRACTICE MIDWIFE

## 2022-07-22 ENCOUNTER — LAB REQUISITION (OUTPATIENT)
Dept: LAB | Facility: CLINIC | Age: 27
End: 2022-07-22

## 2022-07-22 DIAGNOSIS — Z36.89 ENCOUNTER FOR OTHER SPECIFIED ANTENATAL SCREENING: ICD-10-CM

## 2022-07-22 DIAGNOSIS — R52 PAIN, UNSPECIFIED: ICD-10-CM

## 2022-07-22 LAB
ANTIBODY SCREEN: NEGATIVE
SPECIMEN EXPIRATION DATE: NORMAL

## 2022-07-24 LAB — BACTERIA UR CULT: NO GROWTH

## 2022-09-04 ENCOUNTER — HEALTH MAINTENANCE LETTER (OUTPATIENT)
Age: 27
End: 2022-09-04

## 2022-09-14 ENCOUNTER — LAB REQUISITION (OUTPATIENT)
Dept: LAB | Facility: CLINIC | Age: 27
End: 2022-09-14
Payer: COMMERCIAL

## 2022-09-14 DIAGNOSIS — Z36.89 ENCOUNTER FOR OTHER SPECIFIED ANTENATAL SCREENING: ICD-10-CM

## 2022-09-14 PROCEDURE — 87653 STREP B DNA AMP PROBE: CPT | Mod: ORL | Performed by: ADVANCED PRACTICE MIDWIFE

## 2022-09-15 LAB — GP B STREP DNA SPEC QL NAA+PROBE: NEGATIVE

## 2022-09-21 ENCOUNTER — LAB REQUISITION (OUTPATIENT)
Dept: LAB | Facility: CLINIC | Age: 27
End: 2022-09-21

## 2022-09-21 DIAGNOSIS — L29.9 PRURITUS, UNSPECIFIED: ICD-10-CM

## 2022-09-21 LAB
ALT SERPL W P-5'-P-CCNC: 5 U/L (ref 10–35)
AST SERPL W P-5'-P-CCNC: 21 U/L (ref 10–35)

## 2022-09-21 PROCEDURE — 82239 BILE ACIDS TOTAL: CPT | Performed by: NURSE PRACTITIONER

## 2022-09-21 PROCEDURE — 84460 ALANINE AMINO (ALT) (SGPT): CPT | Performed by: NURSE PRACTITIONER

## 2022-09-21 PROCEDURE — 84450 TRANSFERASE (AST) (SGOT): CPT | Performed by: NURSE PRACTITIONER

## 2022-09-23 LAB — BILE AC SERPL-SCNC: 4 UMOL/L

## 2022-09-30 ENCOUNTER — LAB REQUISITION (OUTPATIENT)
Dept: LAB | Facility: CLINIC | Age: 27
End: 2022-09-30

## 2022-09-30 DIAGNOSIS — R10.11 RIGHT UPPER QUADRANT PAIN: ICD-10-CM

## 2022-09-30 LAB
ALBUMIN MFR UR ELPH: <6 MG/DL
AMYLASE SERPL-CCNC: 82 U/L (ref 28–100)
CREAT UR-MCNC: 32.7 MG/DL
ERYTHROCYTE [DISTWIDTH] IN BLOOD BY AUTOMATED COUNT: 13.9 % (ref 10–15)
HCT VFR BLD AUTO: 35 % (ref 35–47)
HGB BLD-MCNC: 11.1 G/DL (ref 11.7–15.7)
MCH RBC QN AUTO: 27.1 PG (ref 26.5–33)
MCHC RBC AUTO-ENTMCNC: 31.7 G/DL (ref 31.5–36.5)
MCV RBC AUTO: 86 FL (ref 78–100)
PLATELET # BLD AUTO: 174 10E3/UL (ref 150–450)
PROT/CREAT 24H UR: NORMAL MG/G{CREAT}
RBC # BLD AUTO: 4.09 10E6/UL (ref 3.8–5.2)
WBC # BLD AUTO: 10.4 10E3/UL (ref 4–11)

## 2022-09-30 PROCEDURE — 85027 COMPLETE CBC AUTOMATED: CPT | Performed by: ADVANCED PRACTICE MIDWIFE

## 2022-09-30 PROCEDURE — 82150 ASSAY OF AMYLASE: CPT | Performed by: ADVANCED PRACTICE MIDWIFE

## 2022-09-30 PROCEDURE — 84156 ASSAY OF PROTEIN URINE: CPT | Performed by: ADVANCED PRACTICE MIDWIFE

## 2023-01-12 ENCOUNTER — APPOINTMENT (OUTPATIENT)
Dept: OCCUPATIONAL MEDICINE | Facility: OTHER | Age: 28
End: 2023-01-12

## 2023-01-24 ENCOUNTER — APPOINTMENT (OUTPATIENT)
Dept: OCCUPATIONAL MEDICINE | Facility: OTHER | Age: 28
End: 2023-01-24

## 2023-03-15 RX ORDER — VITAMIN A ACETATE, BETA CAROTENE, ASCORBIC ACID, CHOLECALCIFEROL, .ALPHA.-TOCOPHEROL ACETATE, DL-, THIAMINE MONONITRATE, RIBOFLAVIN, NIACINAMIDE, PYRIDOXINE HYDROCHLORIDE, FOLIC ACID, CYANOCOBALAMIN, CALCIUM CARBONATE, FERROUS FUMARATE, ZINC OXIDE, CUPRIC OXIDE 3080; 12; 120; 400; 1; 1.84; 3; 20; 22; 920; 25; 200; 27; 10; 2 [IU]/1; UG/1; MG/1; [IU]/1; MG/1; MG/1; MG/1; MG/1; MG/1; [IU]/1; MG/1; MG/1; MG/1; MG/1; MG/1
1 TABLET, FILM COATED ORAL DAILY
COMMUNITY
End: 2023-03-16

## 2023-03-15 RX ORDER — SERTRALINE HYDROCHLORIDE 100 MG/1
TABLET, FILM COATED ORAL
COMMUNITY
Start: 2022-12-16 | End: 2023-03-16

## 2023-03-15 RX ORDER — COPPER 313.4 MG/1
1 INTRAUTERINE DEVICE INTRAUTERINE
COMMUNITY
Start: 2023-01-04 | End: 2023-10-24

## 2023-03-15 RX ORDER — ACETAMINOPHEN 500 MG
1000 TABLET ORAL
COMMUNITY

## 2023-03-15 RX ORDER — HYDROXYZINE PAMOATE 50 MG/1
CAPSULE ORAL
COMMUNITY
Start: 2022-09-27 | End: 2023-03-16

## 2023-03-15 RX ORDER — ONDANSETRON 4 MG/1
TABLET, ORALLY DISINTEGRATING ORAL
COMMUNITY
Start: 2022-02-03 | End: 2023-10-24

## 2023-03-15 NOTE — PROGRESS NOTES
Assessment & Plan     Encounter to establish care  27 year old female here to establish care.  Medical, surgical, social and family history reviewed and updated    Palpitations  Intermittent.  HR as high as 200 for 1-2 minutes.  Random, can happen at rest.    DDx includes SVT vs other (sinus vs atrial vs junctional vs atrial flutter?  Will obtain ECG and check some labs.  Patient does have history of iron deficiency anemia.  Suspect worsening of the iron deficiency with heavy bleeding  - EKG 12-lead complete w/read - (Clinic Performed)  - CBC with platelets and differential; Future  - Ferritin; Future  - TSH with free T4 reflex; Future  - TSH with free T4 reflex  - CBC with platelets and differential  - Ferritin    Junctional nevus   Mid back.  Benign appearing.  Continue to monitor.  May consider shave biopsy    Menorrhagia with irregular cycle  S/P placement of copper IUD.  Has been bleeding heavily for 3/4 weeks per month since having IUD placed in January  Would prefer hormonal free contraception.   Will wait another 4 weeks, if things do not improve, will remove IUD then  Discussed usage of condoms,  refers not to use them.   Patient is , one spontaneous miscarriage prior to conceiving first child.  Provided hand outs on NFP.  Will benefit from NFP classes/counseling    Iron deficiency  Ferritin low at 11 today.  Possible cause of palpitations?   Will Rx daily slow Fe and recheck labs in 4-6 weeks    Return in about 4 weeks (around 2023) for Follow up.    Kasey Ortiz MD  [facility] - DEDRICK Hdez is a 27 year old, presenting for the following health issues:  Establish Care      HPI     RN Coordinator.   Grew up here and moved to   2 children- 2 year old and 5 month.    [place]&C in [place].    Family history-  MGM- breast cancer, skin cancer  January of this year- increased bleeding- bleeding a lot since he got it.  Period  3 out of 4 weeks of the month.  Cramping and tight feeling in the stomach.    Bleeding almost non-stop since January- No clots.  Bleeding through pads and tampons.  Bleeding through clothes that she is wearing.    [place] is more painful.  Tried the Depo shot.    Took a while to get pregnant after the Depo shot.    Normal PAP.    Daily headaches- throbbing headaches.  Heart palpitations.      Light-headed, dizzy, panicked feeling.  Sitting on the couch.  HR of 200.  No passing out.  Waits it out.      Contraception  -   Method interested in: unsure              Methods used previously: iud  Problems with previous methods: YES- heavy bleeding    History of pregnancies:         No LMP recorded. (Menstrual status: IUD).         No results found for: PAP  : 3  Para: 2  Menstrual cycle: irregular  Flow: heavy, bright red and associated with cramping  History of migraines: No  Smoker: No  1st degree relative with History of: none    Accompanying Signs & Symptoms:   Dysuria: No  Vaginal discharge: No  Painful intercourse: No    Precipitating and/or Alleviating factors:    Currently sexually active: YES  In stable relationship: YES  Desire STD testing: No  Are you planning a pregnancy soon: No    Skin Lesion  Onset/Duration: years  Description  Location: back  Color: brown  Border description: flat, with red around it  Character: round, red  Itching: no  Bleeding:  No  Intensity:  0/10  Progression of Symptoms:  Getting larger  Accompanying signs and symptoms:   Bleeding: No  Scaling: No  Excessive sun exposure/tanning: No  Sunscreen used: YES  History:           Any previous history of skin cancer: No  Any family history of melanoma: YES  Previous episodes of similar lesion: No  Precipitating or alleviating factors: none  Therapies tried and outcome: none      Review of Systems   Constitutional, HEENT, cardiovascular, pulmonary, gi and gu systems are negative, except as otherwise noted.      Objective    BP  102/68 (BP Location: Left arm, Patient Position: Sitting, Cuff Size: Adult Regular)   Pulse 103   Temp 96.8  F (36  C) (Tympanic)   Resp 16   Wt 68.1 kg (150 lb 3.2 oz)   SpO2 99%   BMI 24.24 kg/m    Body mass index is 24.24 kg/m .  Physical Exam   GENERAL: healthy, alert and no distress  EYES: Eyes grossly normal to inspection, PERRL and conjunctivae and sclerae normal  HENT: ear canals and TM's normal, nose and mouth without ulcers or lesions  NECK: no adenopathy, no asymmetry, masses, or scars and thyroid normal to palpation  RESP: lungs clear to auscultation - no rales, rhonchi or wheezes  CV: regular rate and rhythm, normal S1 S2, no S3 or S4, no murmur, click or rub, no peripheral edema and peripheral pulses strong  ABDOMEN: soft, nontender, no hepatosplenomegaly, no masses and bowel sounds normal  MS: no gross musculoskeletal defects noted, no edema  SKIN: no suspicious lesions or rashes, benign appearing nevus of mid back, less than 5 mm in diameter  NEURO: Normal strength and tone, mentation intact and speech normal  PSYCH: mentation appears normal, affect normal/bright

## 2023-03-16 ENCOUNTER — OFFICE VISIT (OUTPATIENT)
Dept: FAMILY MEDICINE | Facility: OTHER | Age: 28
End: 2023-03-16
Attending: STUDENT IN AN ORGANIZED HEALTH CARE EDUCATION/TRAINING PROGRAM
Payer: COMMERCIAL

## 2023-03-16 VITALS
TEMPERATURE: 96.8 F | SYSTOLIC BLOOD PRESSURE: 102 MMHG | HEART RATE: 103 BPM | BODY MASS INDEX: 24.24 KG/M2 | OXYGEN SATURATION: 99 % | WEIGHT: 150.2 LBS | DIASTOLIC BLOOD PRESSURE: 68 MMHG | RESPIRATION RATE: 16 BRPM

## 2023-03-16 DIAGNOSIS — R00.2 PALPITATIONS: ICD-10-CM

## 2023-03-16 DIAGNOSIS — N92.1 MENORRHAGIA WITH IRREGULAR CYCLE: ICD-10-CM

## 2023-03-16 DIAGNOSIS — E61.1 IRON DEFICIENCY: ICD-10-CM

## 2023-03-16 DIAGNOSIS — D22.9 JUNCTIONAL NEVUS: ICD-10-CM

## 2023-03-16 DIAGNOSIS — Z76.89 ENCOUNTER TO ESTABLISH CARE: Primary | ICD-10-CM

## 2023-03-16 LAB
BASOPHILS # BLD AUTO: 0.1 10E3/UL (ref 0–0.2)
BASOPHILS NFR BLD AUTO: 2 %
EOSINOPHIL # BLD AUTO: 0.1 10E3/UL (ref 0–0.7)
EOSINOPHIL NFR BLD AUTO: 1 %
ERYTHROCYTE [DISTWIDTH] IN BLOOD BY AUTOMATED COUNT: 13.2 % (ref 10–15)
FERRITIN SERPL-MCNC: 11 NG/ML (ref 6–175)
HCT VFR BLD AUTO: 37.8 % (ref 35–47)
HGB BLD-MCNC: 12.1 G/DL (ref 11.7–15.7)
IMM GRANULOCYTES # BLD: 0 10E3/UL
IMM GRANULOCYTES NFR BLD: 0 %
LYMPHOCYTES # BLD AUTO: 2.5 10E3/UL (ref 0.8–5.3)
LYMPHOCYTES NFR BLD AUTO: 36 %
MCH RBC QN AUTO: 27.6 PG (ref 26.5–33)
MCHC RBC AUTO-ENTMCNC: 32 G/DL (ref 31.5–36.5)
MCV RBC AUTO: 86 FL (ref 78–100)
MONOCYTES # BLD AUTO: 0.6 10E3/UL (ref 0–1.3)
MONOCYTES NFR BLD AUTO: 8 %
NEUTROPHILS # BLD AUTO: 3.6 10E3/UL (ref 1.6–8.3)
NEUTROPHILS NFR BLD AUTO: 53 %
NRBC # BLD AUTO: 0 10E3/UL
NRBC BLD AUTO-RTO: 0 /100
PLATELET # BLD AUTO: 225 10E3/UL (ref 150–450)
RBC # BLD AUTO: 4.39 10E6/UL (ref 3.8–5.2)
TSH SERPL DL<=0.005 MIU/L-ACNC: 1.15 UIU/ML (ref 0.3–4.2)
WBC # BLD AUTO: 6.9 10E3/UL (ref 4–11)

## 2023-03-16 PROCEDURE — 99204 OFFICE O/P NEW MOD 45 MIN: CPT | Performed by: STUDENT IN AN ORGANIZED HEALTH CARE EDUCATION/TRAINING PROGRAM

## 2023-03-16 PROCEDURE — G0463 HOSPITAL OUTPT CLINIC VISIT: HCPCS | Mod: 25

## 2023-03-16 PROCEDURE — 85025 COMPLETE CBC W/AUTO DIFF WBC: CPT | Mod: ZL | Performed by: STUDENT IN AN ORGANIZED HEALTH CARE EDUCATION/TRAINING PROGRAM

## 2023-03-16 PROCEDURE — 93010 ELECTROCARDIOGRAM REPORT: CPT | Mod: 77 | Performed by: INTERNAL MEDICINE

## 2023-03-16 PROCEDURE — 36415 COLL VENOUS BLD VENIPUNCTURE: CPT | Mod: ZL | Performed by: STUDENT IN AN ORGANIZED HEALTH CARE EDUCATION/TRAINING PROGRAM

## 2023-03-16 PROCEDURE — 82728 ASSAY OF FERRITIN: CPT | Mod: ZL | Performed by: STUDENT IN AN ORGANIZED HEALTH CARE EDUCATION/TRAINING PROGRAM

## 2023-03-16 PROCEDURE — 84443 ASSAY THYROID STIM HORMONE: CPT | Mod: ZL | Performed by: STUDENT IN AN ORGANIZED HEALTH CARE EDUCATION/TRAINING PROGRAM

## 2023-03-16 PROCEDURE — 93005 ELECTROCARDIOGRAM TRACING: CPT | Performed by: STUDENT IN AN ORGANIZED HEALTH CARE EDUCATION/TRAINING PROGRAM

## 2023-03-16 PROCEDURE — G0463 HOSPITAL OUTPT CLINIC VISIT: HCPCS

## 2023-03-16 ASSESSMENT — ANXIETY QUESTIONNAIRES
7. FEELING AFRAID AS IF SOMETHING AWFUL MIGHT HAPPEN: NOT AT ALL
2. NOT BEING ABLE TO STOP OR CONTROL WORRYING: MORE THAN HALF THE DAYS
6. BECOMING EASILY ANNOYED OR IRRITABLE: NOT AT ALL
1. FEELING NERVOUS, ANXIOUS, OR ON EDGE: MORE THAN HALF THE DAYS
5. BEING SO RESTLESS THAT IT IS HARD TO SIT STILL: NOT AT ALL
IF YOU CHECKED OFF ANY PROBLEMS ON THIS QUESTIONNAIRE, HOW DIFFICULT HAVE THESE PROBLEMS MADE IT FOR YOU TO DO YOUR WORK, TAKE CARE OF THINGS AT HOME, OR GET ALONG WITH OTHER PEOPLE: SOMEWHAT DIFFICULT
4. TROUBLE RELAXING: SEVERAL DAYS
3. WORRYING TOO MUCH ABOUT DIFFERENT THINGS: MORE THAN HALF THE DAYS
GAD7 TOTAL SCORE: 7
GAD7 TOTAL SCORE: 7

## 2023-03-16 ASSESSMENT — PAIN SCALES - GENERAL: PAINLEVEL: NO PAIN (0)

## 2023-03-16 ASSESSMENT — PATIENT HEALTH QUESTIONNAIRE - PHQ9: SUM OF ALL RESPONSES TO PHQ QUESTIONS 1-9: 2

## 2023-04-21 ENCOUNTER — OFFICE VISIT (OUTPATIENT)
Dept: FAMILY MEDICINE | Facility: OTHER | Age: 28
End: 2023-04-21
Attending: STUDENT IN AN ORGANIZED HEALTH CARE EDUCATION/TRAINING PROGRAM
Payer: COMMERCIAL

## 2023-04-21 ENCOUNTER — LAB (OUTPATIENT)
Dept: LAB | Facility: OTHER | Age: 28
End: 2023-04-21
Attending: STUDENT IN AN ORGANIZED HEALTH CARE EDUCATION/TRAINING PROGRAM
Payer: COMMERCIAL

## 2023-04-21 ENCOUNTER — TELEPHONE (OUTPATIENT)
Dept: FAMILY MEDICINE | Facility: OTHER | Age: 28
End: 2023-04-21

## 2023-04-21 VITALS
HEART RATE: 109 BPM | OXYGEN SATURATION: 100 % | SYSTOLIC BLOOD PRESSURE: 106 MMHG | WEIGHT: 149 LBS | RESPIRATION RATE: 14 BRPM | DIASTOLIC BLOOD PRESSURE: 70 MMHG | TEMPERATURE: 98.1 F | BODY MASS INDEX: 23.95 KG/M2 | HEIGHT: 66 IN

## 2023-04-21 DIAGNOSIS — R00.2 PALPITATIONS: Primary | ICD-10-CM

## 2023-04-21 DIAGNOSIS — N94.6 DYSMENORRHEA: ICD-10-CM

## 2023-04-21 DIAGNOSIS — E61.1 IRON DEFICIENCY: ICD-10-CM

## 2023-04-21 LAB
ALBUMIN SERPL BCG-MCNC: 4.4 G/DL (ref 3.5–5.2)
ALP SERPL-CCNC: 83 U/L (ref 35–104)
ALT SERPL W P-5'-P-CCNC: 22 U/L (ref 10–35)
ANION GAP SERPL CALCULATED.3IONS-SCNC: 12 MMOL/L (ref 7–15)
AST SERPL W P-5'-P-CCNC: 28 U/L (ref 10–35)
BILIRUB SERPL-MCNC: 0.5 MG/DL
BUN SERPL-MCNC: 15.8 MG/DL (ref 6–20)
CALCIUM SERPL-MCNC: 9.9 MG/DL (ref 8.6–10)
CHLORIDE SERPL-SCNC: 103 MMOL/L (ref 98–107)
CREAT SERPL-MCNC: 0.64 MG/DL (ref 0.51–0.95)
DEPRECATED HCO3 PLAS-SCNC: 23 MMOL/L (ref 22–29)
FERRITIN SERPL-MCNC: 12 NG/ML (ref 6–175)
GFR SERPL CREATININE-BSD FRML MDRD: >90 ML/MIN/1.73M2
GLUCOSE SERPL-MCNC: 55 MG/DL (ref 70–99)
MAGNESIUM SERPL-MCNC: 2 MG/DL (ref 1.7–2.3)
POTASSIUM SERPL-SCNC: 4 MMOL/L (ref 3.4–5.3)
PROT SERPL-MCNC: 8.2 G/DL (ref 6.4–8.3)
SODIUM SERPL-SCNC: 138 MMOL/L (ref 136–145)

## 2023-04-21 PROCEDURE — 99214 OFFICE O/P EST MOD 30 MIN: CPT | Performed by: STUDENT IN AN ORGANIZED HEALTH CARE EDUCATION/TRAINING PROGRAM

## 2023-04-21 PROCEDURE — 82310 ASSAY OF CALCIUM: CPT | Mod: ZL | Performed by: STUDENT IN AN ORGANIZED HEALTH CARE EDUCATION/TRAINING PROGRAM

## 2023-04-21 PROCEDURE — 36415 COLL VENOUS BLD VENIPUNCTURE: CPT | Mod: ZL

## 2023-04-21 PROCEDURE — 83735 ASSAY OF MAGNESIUM: CPT | Mod: ZL | Performed by: STUDENT IN AN ORGANIZED HEALTH CARE EDUCATION/TRAINING PROGRAM

## 2023-04-21 PROCEDURE — 80053 COMPREHEN METABOLIC PANEL: CPT | Performed by: STUDENT IN AN ORGANIZED HEALTH CARE EDUCATION/TRAINING PROGRAM

## 2023-04-21 PROCEDURE — 82728 ASSAY OF FERRITIN: CPT | Mod: ZL

## 2023-04-21 PROCEDURE — 36415 COLL VENOUS BLD VENIPUNCTURE: CPT | Performed by: STUDENT IN AN ORGANIZED HEALTH CARE EDUCATION/TRAINING PROGRAM

## 2023-04-21 PROCEDURE — 80053 COMPREHEN METABOLIC PANEL: CPT | Mod: ZL | Performed by: STUDENT IN AN ORGANIZED HEALTH CARE EDUCATION/TRAINING PROGRAM

## 2023-04-21 PROCEDURE — G0463 HOSPITAL OUTPT CLINIC VISIT: HCPCS

## 2023-04-21 PROCEDURE — 82728 ASSAY OF FERRITIN: CPT | Performed by: STUDENT IN AN ORGANIZED HEALTH CARE EDUCATION/TRAINING PROGRAM

## 2023-04-21 PROCEDURE — 83735 ASSAY OF MAGNESIUM: CPT | Performed by: STUDENT IN AN ORGANIZED HEALTH CARE EDUCATION/TRAINING PROGRAM

## 2023-04-21 RX ORDER — EPINEPHRINE 1 MG/ML
0.3 INJECTION, SOLUTION, CONCENTRATE INTRAVENOUS EVERY 5 MIN PRN
Status: CANCELLED | OUTPATIENT
Start: 2023-04-21

## 2023-04-21 RX ORDER — ALBUTEROL SULFATE 90 UG/1
1-2 AEROSOL, METERED RESPIRATORY (INHALATION)
Status: CANCELLED
Start: 2023-04-21

## 2023-04-21 RX ORDER — HEPARIN SODIUM,PORCINE 10 UNIT/ML
5 VIAL (ML) INTRAVENOUS
Status: CANCELLED | OUTPATIENT
Start: 2023-04-21

## 2023-04-21 RX ORDER — DIPHENHYDRAMINE HYDROCHLORIDE 50 MG/ML
50 INJECTION INTRAMUSCULAR; INTRAVENOUS
Status: CANCELLED
Start: 2023-04-21

## 2023-04-21 RX ORDER — MEPERIDINE HYDROCHLORIDE 25 MG/ML
25 INJECTION INTRAMUSCULAR; INTRAVENOUS; SUBCUTANEOUS EVERY 30 MIN PRN
Status: CANCELLED | OUTPATIENT
Start: 2023-04-21

## 2023-04-21 RX ORDER — ALBUTEROL SULFATE 0.83 MG/ML
2.5 SOLUTION RESPIRATORY (INHALATION)
Status: CANCELLED | OUTPATIENT
Start: 2023-04-21

## 2023-04-21 RX ORDER — HEPARIN SODIUM (PORCINE) LOCK FLUSH IV SOLN 100 UNIT/ML 100 UNIT/ML
5 SOLUTION INTRAVENOUS
Status: CANCELLED | OUTPATIENT
Start: 2023-04-21

## 2023-04-21 RX ORDER — METHYLPREDNISOLONE SODIUM SUCCINATE 125 MG/2ML
125 INJECTION, POWDER, LYOPHILIZED, FOR SOLUTION INTRAMUSCULAR; INTRAVENOUS
Status: CANCELLED
Start: 2023-04-21

## 2023-04-21 ASSESSMENT — PAIN SCALES - GENERAL: PAINLEVEL: NO PAIN (0)

## 2023-04-21 NOTE — PROGRESS NOTES
Assessment & Plan     Palpitations  Rather frequent palpitations.  Worrisome and bothersome to patient.  Possible SVT vs other.  Denies any other worrisome cardiac symptoms.    Will check some labs and also send for [employer] ambulatory monitor  Suspect 2/2 anemia  - Adult Leadless EKG Monitor 8 to 14 Days; Future  - Comprehensive metabolic panel; Future  - Magnesium; Future  - Comprehensive metabolic panel  - Magnesium    Dysmenorrhea  Ongoing since placement of IUD  Patient desires removal as she has had no improvement in her symptoms.  She is on her period currently and would like to come back when her bleeding is gone.  She is okay with waiting another 1-2 weeks.  Patient will check in for follow-up when ready    Iron deficiency  No significant response in ferritin to oral supplementation  Will send for iron infusions as ferritin low and possibly precipitating palpitations    Return in about 2 weeks (around 5/5/2023) for Follow up.    Kasey Ortiz MD  [facility] - DEDRICK Hdez is a 27 year old, presenting for the following health issues:  No chief complaint on file.         View : No data to display.              HPI     Palpitations 2 last week.  Could feel heart fluttering- pulse was 201.  Stayed for 10 minutes.  Usually 2 minutes.  Went down to 180.    Next day at work, not doing anything- happened again- pulse has been over 120 for 10 minutes without being active.  Felt lightheaded.  2 days in a row last week.  10 minutes each.      Menorrhagia with irregular cycle  Onset/Duration: since IUD placement January 2023   Description:   Duration of bleeding episodes: 2-3 weeks days  Frequency of periods: (1st day of one to 1st day of next):  every 8 days  Describe bleeding/flow:   Clots:  no   Number of pads/day: 6        Cramping: during moderate  Accompanying Signs & Symptoms:  Lightheadedness: No  Temperature intolerance: No  Nosebleeds/Easy bruising: No  Vaginal  Discharge: No  Acne: No  Change in body hair: No  History:  No LMP recorded. (Menstrual status: IUD).  Previous normal periods: YES  Contraceptive use: IUD   Sexually active: YES spouse  Any bleeding after intercourse: No  Abnormal PAP Smears: No  Precipitating or alleviating factors: iud  Therapies tried and outcome: None      Concern - Iron deficiency F/U  Onset: one month  Description: low iron  Intensity: mild  Progression of Symptoms:  No symptoms  Accompanying Signs & Symptoms: none  Previous history of similar problem: pregnancy  Precipitating factors:        Worsened by:   Alleviating factors:        Improved by:   Therapies tried and outcome: iron supplement    PALPITATIONS  Continued palpitations, random couple times a month        Review of Systems   Constitutional, HEENT, cardiovascular, pulmonary, gi and gu systems are negative, except as otherwise noted.      Objective    There were no vitals taken for this visit.  There is no height or weight on file to calculate BMI.  Physical Exam   GENERAL: healthy, alert and no distress  EYES: Eyes grossly normal to inspection, PERRL and conjunctivae and sclerae normal  HENT: ear canals and TM's normal, nose and mouth without ulcers or lesions  NECK: no adenopathy, no asymmetry, masses, or scars and thyroid normal to palpation  RESP: lungs clear to auscultation - no rales, rhonchi or wheezes  CV: regular rate and rhythm, normal S1 S2, no S3 or S4, no murmur, click or rub, no peripheral edema and peripheral pulses strong  ABDOMEN: soft, nontender, no hepatosplenomegaly, no masses and bowel sounds normal  MS: no gross musculoskeletal defects noted, no edema  SKIN: no suspicious lesions or rashes  NEURO: Normal strength and tone, mentation intact and speech normal  PSYCH: mentation appears normal, affect normal/bright

## 2023-04-24 ENCOUNTER — HOSPITAL ENCOUNTER (OUTPATIENT)
Dept: CARDIOLOGY | Facility: HOSPITAL | Age: 28
Discharge: HOME | End: 2023-04-24
Attending: STUDENT IN AN ORGANIZED HEALTH CARE EDUCATION/TRAINING PROGRAM | Admitting: INTERNAL MEDICINE
Payer: COMMERCIAL

## 2023-04-24 DIAGNOSIS — R00.0 TACHYCARDIA: ICD-10-CM

## 2023-04-24 PROCEDURE — 93246 EXT ECG>7D<15D RECORDING: CPT

## 2023-04-24 PROCEDURE — 93248 EXT ECG>7D<15D REV&INTERPJ: CPT | Performed by: INTERNAL MEDICINE

## 2023-04-24 NOTE — PROGRESS NOTES
Zio patch placed on patient in outpatient appointment today. Patient was instructed to wear patch for 14 days. Skin was prepped and patch was placed following [employer] guidelines .     Patient was instructed to push button when symptomatic and fill out diary. Patient was instructed not to submerse in water and no swimming, saunas, or hot tubs. Showers may be taken keeping back towards the water. If the area DOES become wet pat it dry with a cloth. If skin irritation occurs patient was instructed to remove patch and contact [employer].    Patient understands we do not receive results until they mail patch back inside the box. Staff reminded patient of outside billing notice which was explained to patient during the scheduling process of this monitor. Patient also instructed to contact [employer] with any questions [phone].    Patient had no further questions and agreed with plan. Patient was sent home with the box, information pamphlet and booklet to miguelina any incidents in.

## 2023-04-28 ENCOUNTER — TELEPHONE (OUTPATIENT)
Dept: INFUSION THERAPY | Facility: OTHER | Age: 28
End: 2023-04-28

## 2023-05-04 ENCOUNTER — OFFICE VISIT (OUTPATIENT)
Dept: FAMILY MEDICINE | Facility: OTHER | Age: 28
End: 2023-05-04
Attending: STUDENT IN AN ORGANIZED HEALTH CARE EDUCATION/TRAINING PROGRAM
Payer: COMMERCIAL

## 2023-05-04 VITALS
TEMPERATURE: 98.3 F | SYSTOLIC BLOOD PRESSURE: 110 MMHG | WEIGHT: 140 LBS | OXYGEN SATURATION: 100 % | DIASTOLIC BLOOD PRESSURE: 68 MMHG | HEART RATE: 85 BPM | BODY MASS INDEX: 22.6 KG/M2

## 2023-05-04 DIAGNOSIS — Z30.432 ENCOUNTER FOR IUD REMOVAL: ICD-10-CM

## 2023-05-04 DIAGNOSIS — N94.6 DYSMENORRHEA: Primary | ICD-10-CM

## 2023-05-04 PROBLEM — Z86.16 HISTORY OF SEVERE ACUTE RESPIRATORY SYNDROME CORONAVIRUS 2 (SARS-COV-2) DISEASE: Status: ACTIVE | Noted: 2023-05-04

## 2023-05-04 PROBLEM — Z87.59 PREVIOUS BABY WITH FETAL GROWTH RESTRICTION: Status: ACTIVE | Noted: 2023-05-04

## 2023-05-04 PROBLEM — Z87.59 HISTORY OF GESTATIONAL HYPERTENSION: Status: ACTIVE | Noted: 2023-05-04

## 2023-05-04 PROCEDURE — G0463 HOSPITAL OUTPT CLINIC VISIT: HCPCS

## 2023-05-04 PROCEDURE — 58301 REMOVE INTRAUTERINE DEVICE: CPT | Performed by: STUDENT IN AN ORGANIZED HEALTH CARE EDUCATION/TRAINING PROGRAM

## 2023-05-04 ASSESSMENT — PAIN SCALES - GENERAL: PAINLEVEL: NO PAIN (0)

## 2023-05-04 NOTE — PROGRESS NOTES
Assessment & Plan     Dysmenorrhea  Likely 2/2 IUD in place.   Had been having very painful and heavy menses since IUD placed which have not improved with time.  Now with severe iron deficiency and fatigue likely 2/2 iron deficiency    Encounter for IUD removal  Indicated for heavy and painful menses since placement of IUD.  Has been having significant discharge since placement as well.  Likely 2/2 body reacting to presence of IUD  We discussed removal, patient desires IUD out.    IUD strings visualized extruding from cervical os and removed with application of very gentle pulling tension on the strings.  Patient tolerated the procedure well.  No immediate bleeding noted.        No follow-ups on file.    Kasey Ortiz MD  [facility] 27 [facility], presenting for the following health issues:  IUD      HPI     Concern - IUD Removal    Patient here for removal of IUD.  She desires removal of her ParaGard IUD today as it has been causing significant pain and heavy menstrual bleeding.   She has no bleeding today, only clear/white copious thick discharge.  No other vaginal symptoms.      Review of Systems   Constitutional, HEENT, cardiovascular, pulmonary, gi and gu systems are negative, except as otherwise noted.      Objective    /68   Pulse 85   Temp 98.3  F (36.8  C)   Wt 63.5 kg (140 lb)   LMP 04/16/2023 (Approximate)   SpO2 100%   BMI 22.60 kg/m    Body mass index is 22.6 kg/m .  Physical Exam   GENERAL: healthy, alert and no distress  EYES: Eyes grossly normal to inspection, PERRL and conjunctivae and sclerae normal  HENT: ear canals and TM's normal, nose and mouth without ulcers or lesions  NECK: no adenopathy, no asymmetry, masses, or scars and thyroid normal to palpation  RESP: lungs clear to auscultation - no rales, rhonchi or wheezes  CV: regular rate and rhythm, normal S1 S2, no S3 or S4, no murmur, click or rub, no peripheral edema and  peripheral pulses strong  ABDOMEN: soft, nontender, no hepatosplenomegaly, no masses and bowel sounds normal  MS: no gross musculoskeletal defects noted, no edema  SKIN: no suspicious lesions or rashes  NEURO: Normal strength and tone, mentation intact and speech normal  PSYCH: mentation appears normal, affect normal/bright  : Normal external genitalia and normal vaginal mucosa, normal cervix. There is copious white/clear discharge at the os.  Once cleared, IUD strings visualized extruding from the cervical os.

## 2023-06-03 ENCOUNTER — HEALTH MAINTENANCE LETTER (OUTPATIENT)
Age: 28
End: 2023-06-03

## 2023-10-11 ENCOUNTER — TELEPHONE (OUTPATIENT)
Dept: INFUSION THERAPY | Facility: OTHER | Age: 28
End: 2023-10-11

## 2023-10-11 NOTE — NURSING NOTE
Message from Erika PAC re scheduling que request still in for therapy plan. On review, Venofer plan was placed 4-21-23. It appears Erika reached out to schedule but patient was never infused.    Can we discontinue the therapy plan?

## 2023-10-24 ENCOUNTER — OFFICE VISIT (OUTPATIENT)
Dept: FAMILY MEDICINE | Facility: OTHER | Age: 28
End: 2023-10-24
Attending: FAMILY MEDICINE
Payer: COMMERCIAL

## 2023-10-24 VITALS
RESPIRATION RATE: 17 BRPM | BODY MASS INDEX: 24.13 KG/M2 | HEART RATE: 109 BPM | SYSTOLIC BLOOD PRESSURE: 120 MMHG | WEIGHT: 149.5 LBS | DIASTOLIC BLOOD PRESSURE: 80 MMHG | TEMPERATURE: 98.6 F | OXYGEN SATURATION: 100 %

## 2023-10-24 DIAGNOSIS — E61.1 IRON DEFICIENCY: ICD-10-CM

## 2023-10-24 DIAGNOSIS — Z13.220 LIPID SCREENING: ICD-10-CM

## 2023-10-24 DIAGNOSIS — J02.0 PHARYNGITIS DUE TO STREPTOCOCCUS SPECIES: Primary | ICD-10-CM

## 2023-10-24 DIAGNOSIS — M54.2 NECK PAIN: ICD-10-CM

## 2023-10-24 DIAGNOSIS — M62.838 MUSCLE SPASM: ICD-10-CM

## 2023-10-24 LAB
ALBUMIN SERPL BCG-MCNC: 4.7 G/DL (ref 3.5–5.2)
ALP SERPL-CCNC: 77 U/L (ref 35–104)
ALT SERPL W P-5'-P-CCNC: 24 U/L (ref 0–50)
ANION GAP SERPL CALCULATED.3IONS-SCNC: 12 MMOL/L (ref 7–15)
AST SERPL W P-5'-P-CCNC: 30 U/L (ref 0–45)
BASOPHILS # BLD AUTO: 0.1 10E3/UL (ref 0–0.2)
BASOPHILS NFR BLD AUTO: 1 %
BILIRUB SERPL-MCNC: 0.7 MG/DL
BUN SERPL-MCNC: 10.4 MG/DL (ref 6–20)
CALCIUM SERPL-MCNC: 9.7 MG/DL (ref 8.6–10)
CHLORIDE SERPL-SCNC: 103 MMOL/L (ref 98–107)
CHOLEST SERPL-MCNC: 186 MG/DL
CREAT SERPL-MCNC: 0.65 MG/DL (ref 0.51–0.95)
DEPRECATED HCO3 PLAS-SCNC: 23 MMOL/L (ref 22–29)
EGFRCR SERPLBLD CKD-EPI 2021: >90 ML/MIN/1.73M2
EOSINOPHIL # BLD AUTO: 0.1 10E3/UL (ref 0–0.7)
EOSINOPHIL NFR BLD AUTO: 1 %
ERYTHROCYTE [DISTWIDTH] IN BLOOD BY AUTOMATED COUNT: 16.5 % (ref 10–15)
FERRITIN SERPL-MCNC: 12 NG/ML (ref 6–175)
GLUCOSE SERPL-MCNC: 93 MG/DL (ref 70–99)
GROUP A STREP BY PCR: DETECTED
HCT VFR BLD AUTO: 39.5 % (ref 35–47)
HDLC SERPL-MCNC: 60 MG/DL
HGB BLD-MCNC: 12.6 G/DL (ref 11.7–15.7)
IMM GRANULOCYTES # BLD: 0 10E3/UL
IMM GRANULOCYTES NFR BLD: 0 %
IRON BINDING CAPACITY (ROCHE): 448 UG/DL (ref 240–430)
IRON SATN MFR SERPL: 10 % (ref 15–46)
IRON SERPL-MCNC: 46 UG/DL (ref 37–145)
LDLC SERPL CALC-MCNC: 108 MG/DL
LYMPHOCYTES # BLD AUTO: 3.8 10E3/UL (ref 0.8–5.3)
LYMPHOCYTES NFR BLD AUTO: 47 %
MCH RBC QN AUTO: 26.5 PG (ref 26.5–33)
MCHC RBC AUTO-ENTMCNC: 31.9 G/DL (ref 31.5–36.5)
MCV RBC AUTO: 83 FL (ref 78–100)
MONOCYTES # BLD AUTO: 0.5 10E3/UL (ref 0–1.3)
MONOCYTES NFR BLD AUTO: 6 %
NEUTROPHILS # BLD AUTO: 3.6 10E3/UL (ref 1.6–8.3)
NEUTROPHILS NFR BLD AUTO: 45 %
NONHDLC SERPL-MCNC: 126 MG/DL
NRBC # BLD AUTO: 0 10E3/UL
NRBC BLD AUTO-RTO: 0 /100
PLATELET # BLD AUTO: 221 10E3/UL (ref 150–450)
POTASSIUM SERPL-SCNC: 4 MMOL/L (ref 3.4–5.3)
PROT SERPL-MCNC: 8.7 G/DL (ref 6.4–8.3)
RBC # BLD AUTO: 4.75 10E6/UL (ref 3.8–5.2)
SODIUM SERPL-SCNC: 138 MMOL/L (ref 135–145)
TRIGL SERPL-MCNC: 88 MG/DL
TSH SERPL DL<=0.005 MIU/L-ACNC: 1.14 UIU/ML (ref 0.3–4.2)
WBC # BLD AUTO: 8.1 10E3/UL (ref 4–11)

## 2023-10-24 PROCEDURE — 80061 LIPID PANEL: CPT | Performed by: FAMILY MEDICINE

## 2023-10-24 PROCEDURE — 82728 ASSAY OF FERRITIN: CPT | Performed by: FAMILY MEDICINE

## 2023-10-24 PROCEDURE — 36415 COLL VENOUS BLD VENIPUNCTURE: CPT | Performed by: FAMILY MEDICINE

## 2023-10-24 PROCEDURE — 80050 GENERAL HEALTH PANEL: CPT | Performed by: FAMILY MEDICINE

## 2023-10-24 PROCEDURE — 87651 STREP A DNA AMP PROBE: CPT | Performed by: FAMILY MEDICINE

## 2023-10-24 PROCEDURE — 83540 ASSAY OF IRON: CPT | Performed by: FAMILY MEDICINE

## 2023-10-24 PROCEDURE — 83550 IRON BINDING TEST: CPT | Performed by: FAMILY MEDICINE

## 2023-10-24 PROCEDURE — 99214 OFFICE O/P EST MOD 30 MIN: CPT | Performed by: FAMILY MEDICINE

## 2023-10-24 RX ORDER — AMOXICILLIN 500 MG/1
500 CAPSULE ORAL 2 TIMES DAILY
Qty: 20 CAPSULE | Refills: 0 | Status: SHIPPED | OUTPATIENT
Start: 2023-10-24 | End: 2023-11-03

## 2023-10-24 ASSESSMENT — ENCOUNTER SYMPTOMS
DIARRHEA: 0
NAUSEA: 0
PALPITATIONS: 0
APPETITE CHANGE: 0
HEADACHES: 0
NECK PAIN: 1
TROUBLE SWALLOWING: 0
VOMITING: 0
ABDOMINAL PAIN: 0
NERVOUS/ANXIOUS: 1
RHINORRHEA: 0
SHORTNESS OF BREATH: 0
CHILLS: 0
FATIGUE: 1
FEVER: 0
COUGH: 0
SORE THROAT: 0

## 2023-10-24 ASSESSMENT — PAIN SCALES - GENERAL: PAINLEVEL: NO PAIN (0)

## 2023-10-24 NOTE — PROGRESS NOTES
Assessment & Plan     Pharyngitis due to Streptococcus species  Most likely contributing to symptoms, but also having muscle spasms   - Group A Streptococcus PCR Throat Swab ([place] ONLY)  - TSH with free T4 reflex  - Comprehensive metabolic panel (BMP + Alb, Alk Phos, ALT, AST, Total. Bili, TP)  - amoxicillin (AMOXIL) 500 MG capsule; Take 1 capsule (500 mg) by mouth 2 times daily for 10 days    Lipid screening  LDL today of 108  - Lipid Profile (Chol, Trig, HDL, LDL calc)    Iron deficiency  Ferritin still low   - CBC with platelets and differential  - Ferritin  - Iron and iron binding capacity  - start iron supplement every day for three months. Rx sent     Muscle spasm / Neck pain  Muscle spasms  - Chiropractic Referral       See Patient Instructions    Return if symptoms worsen or fail to improve.    Karime Fulton MD  [facility] - [place]    Curry Hdez is a 28 year old, presenting for the following health issues:  URI      HPI       Acute Illness  Acute illness concerns: tightening of the throat. On and off   Onset/Duration: Week ago - has been worse in the last few days  Symptoms:  Fever: No  Chills/Sweats: No  Headache (location?): No  Sinus Pressure: No  Conjunctivitis:  No  Ear Pain: no  Rhinorrhea: No  Congestion: No  Sore Throat: No  Cough: no  Wheeze: No  Decreased Appetite: No  Nausea: No  Vomiting: No  Diarrhea: No  Dysuria/Freq.: No  Dysuria or Hematuria: No  Fatigue/Achiness: No  Sick/Strep Exposure: No  Therapies tried and outcome: None  States that it feels like tightening of the glands which is giving her panic attacks        Review of Systems   Constitutional:  Positive for fatigue (d/t kiddos). Negative for appetite change, chills and fever.   HENT:  Negative for congestion, ear pain, rhinorrhea, sore throat and trouble swallowing.    Respiratory:  Negative for cough and shortness of breath.    Cardiovascular:  Negative for chest pain and palpitations.    Gastrointestinal:  Negative for abdominal pain, diarrhea, nausea and vomiting.   Musculoskeletal:  Positive for neck pain (neck tightness).   Neurological:  Negative for headaches.   Psychiatric/Behavioral:  The patient is nervous/anxious.           Objective    /80   Pulse 109   Temp 98.6  F (37  C) (Tympanic)   Resp 17   Wt 67.8 kg (149 lb 8 oz)   SpO2 100%   BMI 24.13 kg/m    Body mass index is 24.13 kg/m .  Physical Exam  Constitutional:       General: She is not in acute distress.     Appearance: She is well-developed.   HENT:      Head: Normocephalic and atraumatic.      Right Ear: Hearing and tympanic membrane normal.      Left Ear: Hearing and tympanic membrane normal.      Mouth/Throat:      Mouth: Mucous membranes are moist.      Pharynx: Posterior oropharyngeal erythema present. No oropharyngeal exudate.   Eyes:      Extraocular Movements: Extraocular movements intact.      Conjunctiva/sclera: Conjunctivae normal.   Neck:      Thyroid: No thyromegaly.      Comments: Muscle spasms b/l  Cardiovascular:      Rate and Rhythm: Normal rate and regular rhythm.      Pulses: Normal pulses.      Heart sounds: Normal heart sounds. No murmur heard.  Pulmonary:      Effort: Pulmonary effort is normal. No respiratory distress.      Breath sounds: Normal breath sounds. No wheezing or rales.   Abdominal:      General: Bowel sounds are normal. There is no distension.      Palpations: Abdomen is soft.      Tenderness: There is no abdominal tenderness. There is no guarding.   Musculoskeletal:         General: Normal range of motion.      Cervical back: Normal range of motion and neck supple.   Lymphadenopathy:      Cervical: No cervical adenopathy.   Skin:     General: Skin is dry.   Neurological:      Mental Status: She is alert.   Psychiatric:         Mood and Affect: Mood is anxious.          Results for orders placed or performed in visit on 10/24/23 (from the past 24 hour(s))   Group A Streptococcus PCR  Throat Swab ([place] ONLY)    Specimen: Throat; Swab   Result Value Ref Range    Group A strep by PCR Detected (A) Not Detected    Narrative    The Xpert Xpress Strep A test, performed on the [employer], is a rapid, qualitative in vitro diagnostic test for the detection of Streptococcus pyogenes (Group A ß-hemolytic Streptococcus, Strep A) in throat swab specimens from patients with signs and symptoms of pharyngitis. The Xpert Xpress Strep A test can be used as an aid in the diagnosis of Group A Streptococcal pharyngitis. The assay is not intended to monitor treatment for Group A Streptococcus infections. The Xpert Xpress Strep A test utilizes an automated real-time polymerase chain reaction (PCR) to detect Streptococcus pyogenes DNA.   Lipid Profile (Chol, Trig, HDL, LDL calc)   Result Value Ref Range    Cholesterol 186 <200 mg/dL    Triglycerides 88 <150 mg/dL    Direct Measure HDL 60 >=50 mg/dL    LDL Cholesterol Calculated 108 (H) <=100 mg/dL    Non HDL Cholesterol 126 <130 mg/dL    Narrative    Cholesterol  Desirable:  <200 mg/dL    Triglycerides  Normal:  Less than 150 mg/dL  Borderline High:  150-199 mg/dL  High:  200-499 mg/dL  Very High:  Greater than or equal to 500 mg/dL    Direct Measure HDL  Female:  Greater than or equal to 50 mg/dL   Male:  Greater than or equal to 40 mg/dL    LDL Cholesterol  Desirable:  <100mg/dL  Above Desirable:  100-129 mg/dL   Borderline High:  130-159 mg/dL   High:  160-189 mg/dL   Very High:  >= 190 mg/dL    Non HDL Cholesterol  Desirable:  130 mg/dL  Above Desirable:  130-159 mg/dL  Borderline High:  160-189 mg/dL  High:  190-219 mg/dL  Very High:  Greater than or equal to 220 mg/dL   CBC with platelets and differential    Narrative    The following orders were created for panel order CBC with platelets and differential.  Procedure                               Abnormality         Status                     ---------                                -----------         ------                     CBC with platelets and d...[[id number]]  Abnormal            Final result                 Please view results for these tests on the individual orders.   Ferritin   Result Value Ref Range    Ferritin 12 6 - 175 ng/mL   Iron and iron binding capacity   Result Value Ref Range    Iron 46 37 - 145 ug/dL    Iron Binding Capacity 448 (H) 240 - 430 ug/dL    Iron Sat Index 10 (L) 15 - 46 %   TSH with free T4 reflex   Result Value Ref Range    TSH 1.14 0.30 - 4.20 uIU/mL   Comprehensive metabolic panel (BMP + Alb, Alk Phos, ALT, AST, Total. Bili, TP)   Result Value Ref Range    Sodium 138 135 - 145 mmol/L    Potassium 4.0 3.4 - 5.3 mmol/L    Carbon Dioxide (CO2) 23 22 - 29 mmol/L    Anion Gap 12 7 - 15 mmol/L    Urea Nitrogen 10.4 6.0 - 20.0 mg/dL    Creatinine 0.65 0.51 - 0.95 mg/dL    GFR Estimate >90 >60 mL/min/1.73m2    Calcium 9.7 8.6 - 10.0 mg/dL    Chloride 103 98 - 107 mmol/L    Glucose 93 70 - 99 mg/dL    Alkaline Phosphatase 77 35 - 104 U/L    AST 30 0 - 45 U/L    ALT 24 0 - 50 U/L    Protein Total 8.7 (H) 6.4 - 8.3 g/dL    Albumin 4.7 3.5 - 5.2 g/dL    Bilirubin Total 0.7 <=1.2 mg/dL   CBC with platelets and differential   Result Value Ref Range    WBC Count 8.1 4.0 - 11.0 10e3/uL    RBC Count 4.75 3.80 - 5.20 10e6/uL    Hemoglobin 12.6 11.7 - 15.7 g/dL    Hematocrit 39.5 35.0 - 47.0 %    MCV 83 78 - 100 fL    MCH 26.5 26.5 - 33.0 pg    MCHC 31.9 31.5 - 36.5 g/dL    RDW 16.5 (H) 10.0 - 15.0 %    Platelet Count 221 150 - 450 10e3/uL    % Neutrophils 45 %    % Lymphocytes 47 %    % Monocytes 6 %    % Eosinophils 1 %    % Basophils 1 %    % Immature Granulocytes 0 %    NRBCs per 100 WBC 0 <1 /100    Absolute Neutrophils 3.6 1.6 - 8.3 10e3/uL    Absolute Lymphocytes 3.8 0.8 - 5.3 10e3/uL    Absolute Monocytes 0.5 0.0 - 1.3 10e3/uL    Absolute Eosinophils 0.1 0.0 - 0.7 10e3/uL    Absolute Basophils 0.1 0.0 - 0.2 10e3/uL    Absolute Immature Granulocytes 0.0 <=0.4 10e3/uL     Absolute NRBCs 0.0 10e3/uL

## 2023-10-24 NOTE — PATIENT INSTRUCTIONS
We will call you with your lab results.     Try heating on your neck for pain   Okay to use Tylenol and ibuprofen     Referral placed to Chiropractic

## 2023-11-20 ENCOUNTER — TELEPHONE (OUTPATIENT)
Dept: FAMILY MEDICINE | Facility: OTHER | Age: 28
End: 2023-11-20

## 2023-11-20 NOTE — TELEPHONE ENCOUNTER
Therapy plan orders created by you on 4-21-23 for Venofer, however patient did not come for infusions. Infusions remain in schedulers work que, and she is asking for order/plan discontinue. It appears our  reached out to patient but she was never infused.     Can we discontinue therapy plan for Venofer?

## 2023-11-21 ENCOUNTER — OFFICE VISIT (OUTPATIENT)
Dept: CHIROPRACTIC MEDICINE | Facility: OTHER | Age: 28
End: 2023-11-21
Attending: FAMILY MEDICINE
Payer: COMMERCIAL

## 2023-11-21 DIAGNOSIS — M54.2 CERVICALGIA: ICD-10-CM

## 2023-11-21 DIAGNOSIS — M99.03 SEGMENTAL AND SOMATIC DYSFUNCTION OF LUMBAR REGION: ICD-10-CM

## 2023-11-21 DIAGNOSIS — M99.01 SEGMENTAL AND SOMATIC DYSFUNCTION OF CERVICAL REGION: Primary | ICD-10-CM

## 2023-11-21 DIAGNOSIS — M99.02 SEGMENTAL AND SOMATIC DYSFUNCTION OF THORACIC REGION: ICD-10-CM

## 2023-11-21 PROCEDURE — 99202 OFFICE O/P NEW SF 15 MIN: CPT | Mod: 25 | Performed by: CHIROPRACTOR

## 2023-11-21 PROCEDURE — 98941 CHIROPRACT MANJ 3-4 REGIONS: CPT | Mod: AT | Performed by: CHIROPRACTOR

## 2023-11-22 NOTE — PROGRESS NOTES
Subjective Finding:    Chief compalint: Patient presents with:  Back Pain: Low back and neck pain.  Upper back and neck pain ongoing for a few months now , Pain Scale: 7/10, Intensity: dull and ache, Duration: 2 months, Radiating: no.    Date of injury:     Activities that the pain restricts:   Home/household/hobbies/social activities: Yes.  Work duties: No.  Sleep: No.  Makes symptoms better: rest.  Makes symptoms worse: activity, lumbar extension, and lumbar flexion.  Have you seen anyone else for the symptoms? No.  Work related: no  Automobile related injury: No.    Objective and Assessment:    Posture Analysis:   High shoulder: right.  Head tilt: right.  High iliac crest: .  Head carriage: forward.  Thoracic Kyphosis: neutral.  Lumbar Lordosis: neutral.    Lumbar Range of Motion: extension decreased.  Cervical Range of Motion: left lateral flexion decreased and right lateral flexion decreased.  Thoracic Range of Motion: .  Extremity Range of Motion: .    Palpation:   Lev scapulae: sharp pain, referred pain: no    Segmental dysfunction pre-treatment and treatment area: C4, C5, T2, L4, and L5.    Assessment post-treatment:  Cervical: ROM increased.  Thoracic: ROM increased.  Lumbar: ROM increased.    Comments: .      Complicating Factors: .    Procedure(s):  CMT:  60926 Chiropractic manipulative treatment 3-4 regions performed   Cervical: Diversified, See above for level, Supine, Thoracic: Diversified, See above for level, Prone, and Lumbar: Diversified, See above for level, Side posture    Modalities:  None performed this visit    Therapeutic procedures:  None    Plan:  Treatment plan: PRN.  Instructed patient: stretch as instructed at visit and walk 10 minutes.  Short term goals: increase ROM.  Long term goals: restore normal function.  Prognosis: excellent.

## 2023-12-04 ENCOUNTER — OFFICE VISIT (OUTPATIENT)
Dept: FAMILY MEDICINE | Facility: OTHER | Age: 28
End: 2023-12-04
Attending: STUDENT IN AN ORGANIZED HEALTH CARE EDUCATION/TRAINING PROGRAM
Payer: COMMERCIAL

## 2023-12-04 VITALS
WEIGHT: 140 LBS | OXYGEN SATURATION: 100 % | RESPIRATION RATE: 16 BRPM | HEART RATE: 90 BPM | BODY MASS INDEX: 22.6 KG/M2 | DIASTOLIC BLOOD PRESSURE: 62 MMHG | TEMPERATURE: 97.1 F | SYSTOLIC BLOOD PRESSURE: 110 MMHG

## 2023-12-04 DIAGNOSIS — L91.8 SKIN TAG: Primary | ICD-10-CM

## 2023-12-04 PROCEDURE — 11200 RMVL SKIN TAGS UP TO&INC 15: CPT | Performed by: STUDENT IN AN ORGANIZED HEALTH CARE EDUCATION/TRAINING PROGRAM

## 2023-12-04 ASSESSMENT — PAIN SCALES - GENERAL: PAINLEVEL: NO PAIN (0)

## 2023-12-04 NOTE — PROGRESS NOTES
Assessment & Plan     Skin tag  Patient here for removal of two skin tags- one below left eye and one on right side of neck.  Hyfrecator used for procedure on low power- patient tolerated it well with no requirement for anesthesia.       No follow-ups on file.    Kasey Ortiz MD  [facility] - [place] Hdez is a 28 year old, presenting for the following health issues:  Skin Tags        10/24/2023    11:20 AM   Additional Questions   Roomed by Risa Martinez   Accompanied by self       HPI     Skin Lesion  Onset/Duration: skin tags  Description  Location: neck and lower left eye  Color: skin colored  Border description: raised  Character: round  Itching: no  Bleeding:  No  Intensity:  mild  Progression of Symptoms:  same  Accompanying signs and symptoms:   Bleeding: No  Scaling: No  Excessive sun exposure/tanning: No  Sunscreen used: YES  History:           Any previous history of skin cancer: No  Any family history of melanoma: YES  Previous episodes of similar lesion: No  Precipitating or alleviating factors: none  Therapies tried and outcome: none      Review of Systems   Constitutional, HEENT, cardiovascular, pulmonary, gi and gu systems are negative, except as otherwise noted.      Objective    /62 (BP Location: Right arm, Patient Position: Sitting, Cuff Size: Adult Regular)   Pulse 90   Temp 97.1  F (36.2  C) (Tympanic)   Resp 16   Wt 63.5 kg (140 lb)   LMP 11/27/2023 (Exact Date)   SpO2 100%   BMI 22.60 kg/m    Body mass index is 22.6 kg/m .  Physical Exam   GENERAL: healthy, alert and no distress  EYES: Eyes grossly normal to inspection, PERRL and conjunctivae and sclerae normal  HENT: ear canals and TM's normal, nose and mouth without ulcers or lesions  NECK: no adenopathy, no asymmetry, masses, or scars and thyroid normal to palpation- small skin tag on right upper neck  SKIN: no suspicious lesions or rashes, small skin tag below left eye  NEURO: Normal  strength and tone, mentation intact and speech normal  PSYCH: mentation appears normal, affect normal/bright

## 2024-01-04 ENCOUNTER — OFFICE VISIT (OUTPATIENT)
Dept: FAMILY MEDICINE | Facility: OTHER | Age: 29
End: 2024-01-04
Attending: STUDENT IN AN ORGANIZED HEALTH CARE EDUCATION/TRAINING PROGRAM
Payer: COMMERCIAL

## 2024-01-04 VITALS
HEART RATE: 86 BPM | TEMPERATURE: 97.4 F | DIASTOLIC BLOOD PRESSURE: 66 MMHG | RESPIRATION RATE: 15 BRPM | OXYGEN SATURATION: 100 % | BODY MASS INDEX: 22.5 KG/M2 | HEIGHT: 66 IN | SYSTOLIC BLOOD PRESSURE: 104 MMHG | WEIGHT: 140 LBS

## 2024-01-04 DIAGNOSIS — L91.8 SKIN TAG: Primary | ICD-10-CM

## 2024-01-04 PROCEDURE — 11200 RMVL SKIN TAGS UP TO&INC 15: CPT | Performed by: STUDENT IN AN ORGANIZED HEALTH CARE EDUCATION/TRAINING PROGRAM

## 2024-01-04 ASSESSMENT — PAIN SCALES - GENERAL: PAINLEVEL: NO PAIN (0)

## 2024-01-04 NOTE — PROGRESS NOTES
"  {PROVIDER CHARTING PREFERENCE:332943}    Subjective   Ketty is a 28 year old, presenting for the following health issues:  Skin Tags        1/4/2024     1:32 PM   Additional Questions   Roomed by AGUSTINA Duenas   Accompanied by self`       HPI     Concern - follow up skin tag removal   Onset: ongoing  Description: skin tag on neck and below left eye  Intensity: mild  Progression of Symptoms:  same  Accompanying Signs & Symptoms: none  Previous history of similar problem: none  Precipitating factors:        Worsened by: none  Alleviating factors:        Improved by: none  Therapies tried and outcome: None  {additonal problems for provider to add (Optional):632241}      Review of Systems   {ROS COMP (Optional):448789}      Objective    /66 (BP Location: Left arm, Patient Position: Sitting, Cuff Size: Adult Regular)   Pulse 86   Temp 97.4  F (36.3  C) (Tympanic)   Resp 15   Ht 1.676 m (5' 6\")   Wt 63.5 kg (140 lb)   LMP 12/26/2023 (Approximate)   SpO2 100%   BMI 22.60 kg/m    Body mass index is 22.6 kg/m .  Physical Exam   {Exam List (Optional):123051}    {Diagnostic Test Results (Optional):851863}    {AMBULATORY ATTESTATION (Optional):876980}              "

## 2024-01-04 NOTE — PROGRESS NOTES
"  Assessment & Plan     Skin tag  Two small pedunculated skin tags.  One small tag just under left eye and another on the right side of anterior neck.  We did attempt removal approximately one month ago with Hyfrecator but the skin tags did not respond. Today we did attempt to remove those again.    She tolerated procedure well without complications.  Discussed that erythema is normal and skin tag should fall off immediately or within the span of the upcoming week.        Kasey Ortiz MD  [facility] - [place] Hdez is a 28 year old, presenting for the following health issues:  Skin Tags      1/4/2024     1:30 PM   Additional Questions   Roomed by Florence ASHTON LPN   Accompanied by self       HPI     Skin Tag Removal  Onset/Duration: Several months  Description  Location: Neck and lower left eye  Color: brown and skin colored  Border description: raised  Character: round  Itching: no  Bleeding:  No  Intensity:  mild  Progression of Symptoms:  Mild, cosmetically bothersome  Accompanying signs and symptoms:   Bleeding: No  Scaling: No  Excessive sun exposure/tanning: No  Sunscreen used: YES  History:           Any previous history of skin cancer: No  Any family history of melanoma: YES  Previous episodes of similar lesion: No  Precipitating or alleviating factors: none  Therapies tried and outcome: none      Review of Systems   Constitutional, HEENT, cardiovascular, pulmonary, gi and gu systems are negative, except as otherwise noted.      Objective    /66 (BP Location: Left arm, Patient Position: Sitting, Cuff Size: Adult Regular)   Pulse 86   Temp 97.4  F (36.3  C) (Tympanic)   Resp 15   Ht 1.676 m (5' 6\")   Wt 63.5 kg (140 lb)   LMP 12/26/2023 (Approximate)   SpO2 100%   BMI 22.60 kg/m    There is no height or weight on file to calculate BMI.  Physical Exam   GENERAL: healthy, alert and no distress  EYES: Eyes grossly normal to inspection, PERRL and conjunctivae and " sclerae normal  HENT: ear canals and TM's normal, nose and mouth without ulcers or lesions  NECK: no adenopathy, no asymmetry, masses, or scars and thyroid normal to palpation  RESP: lungs clear to auscultation - no rales, rhonchi or wheezes  CV: regular rate and rhythm, normal S1 S2, no S3 or S4, no murmur, click or rub, no peripheral edema and peripheral pulses strong  ABDOMEN: soft, nontender, no hepatosplenomegaly, no masses and bowel sounds normal  MS: no gross musculoskeletal defects noted, no edema  SKIN: no suspicious lesions or rashes, Two small pedunculated skin tags.  One small tag just under left eye and another on the right side of anterior neck.  NEURO: Normal strength and tone, mentation intact and speech normal  PSYCH: mentation appears normal, affect normal/bright        Skin procedure: electrocautery/electrodessication  Diagnosis: skin tags  Anesthesia: none  Procedure: patient placed supine on exam table.  Low current hyfrecator applied to base of lesions below the base of the eye as well as right anterior neck.  No immediate complications and patient tolerated the procedure well

## 2024-04-18 ENCOUNTER — OFFICE VISIT (OUTPATIENT)
Dept: FAMILY MEDICINE | Facility: OTHER | Age: 29
End: 2024-04-18
Attending: NURSE PRACTITIONER
Payer: COMMERCIAL

## 2024-04-18 VITALS
DIASTOLIC BLOOD PRESSURE: 74 MMHG | WEIGHT: 140 LBS | RESPIRATION RATE: 16 BRPM | SYSTOLIC BLOOD PRESSURE: 109 MMHG | TEMPERATURE: 97.7 F | HEART RATE: 98 BPM | OXYGEN SATURATION: 99 % | BODY MASS INDEX: 22.6 KG/M2

## 2024-04-18 DIAGNOSIS — J34.89 SINUS PRESSURE: Primary | ICD-10-CM

## 2024-04-18 PROCEDURE — 99213 OFFICE O/P EST LOW 20 MIN: CPT | Performed by: NURSE PRACTITIONER

## 2024-04-18 RX ORDER — FLUTICASONE PROPIONATE 50 MCG
1 SPRAY, SUSPENSION (ML) NASAL DAILY
Qty: 16 G | Refills: 0 | Status: SHIPPED | OUTPATIENT
Start: 2024-04-18

## 2024-04-18 RX ORDER — CETIRIZINE HYDROCHLORIDE 10 MG/1
10 TABLET ORAL DAILY
Qty: 90 TABLET | Refills: 0 | Status: SHIPPED | OUTPATIENT
Start: 2024-04-18

## 2024-04-18 ASSESSMENT — PAIN SCALES - GENERAL: PAINLEVEL: MODERATE PAIN (5)

## 2024-04-18 NOTE — PROGRESS NOTES
Assessment & Plan     (J34.89) Sinus pressure  (primary encounter diagnosis)  Comment: patient with sinus pressure times 5 days, serous effusion also noted behind both TMs  Plan: cetirizine (ZYRTEC) 10 MG tablet, fluticasone (FLONASE) 50 MCG/ACT nasal spray        Will start Zyrtec and Flonase. If symptoms are not better by next week, will start Augmentin for a sinus infection.     Symptomatic cares encouraged. The lack of efficacy of antibiotics was discussed. The patient will follow up with new or worsening symptoms.           Curry Hdez is a 28 year old, presenting for the following health issues:  Otalgia    HPI     Acute Illness  Acute illness concerns: Sinus congestion and bilateral ear pain   Onset/Duration: 4/14/2024  Symptoms:  Fever: No  Chills/Sweats: No  Headache (location?): YES  Sinus Pressure: YES  Conjunctivitis:  No  Ear Pain: YES: bilateral  Rhinorrhea: No  Congestion: YES  Sore Throat: No  Cough: no  Wheeze: No, no trouble breathing  Decreased Appetite: No  Nausea: No  Vomiting: No  Diarrhea: No  Dysuria/Freq.: No  Dysuria or Hematuria: No  Fatigue/Achiness: No  Sick/Strep Exposure: YES- Kids have a cold   Therapies tried and outcome: Alternating tylenol, ibuprofen and clartin   She does not smoke.     She does have seasonal allergies, worse in the Spring.       Review of Systems  Constitutional, HEENT, cardiovascular, pulmonary, gi and gu systems are negative, except as otherwise noted.      Objective    /74 (BP Location: Left arm, Patient Position: Sitting, Cuff Size: Adult Regular)   Pulse 98   Temp 97.7  F (36.5  C) (Tympanic)   Resp 16   Wt 63.5 kg (140 lb)   LMP 04/08/2024 (Exact Date)   SpO2 99%   BMI 22.60 kg/m    Body mass index is 22.6 kg/m .  Physical Exam   GENERAL: alert and no distress  EYES: Eyes grossly normal to inspection, PERRL and conjunctivae and sclerae normal  HENT: ear canals normal, she does have serous effusion behind both TMs, however, no  erythema is seen, nose and mouth without ulcers or lesions  NECK: no adenopathy, no asymmetry, masses, or scars  RESP: lungs clear to auscultation - no rales, rhonchi or wheezes  CV: regular rate and rhythm, normal S1 S2, no S3 or S4, no murmur, click or rub, no peripheral edema  MS: no gross musculoskeletal defects noted, no edema  NEURO: Normal strength and tone, mentation intact and speech normal  PSYCH: mentation appears normal, affect normal/bright            Signed Electronically by: Joyce Han, NP

## 2024-04-22 DIAGNOSIS — J01.90 ACUTE SINUSITIS WITH SYMPTOMS GREATER THAN 10 DAYS: Primary | ICD-10-CM

## 2024-04-22 NOTE — PROGRESS NOTES
Patient congested with significant sinus pressure. Symptoms present for 10 days. Will treat with Augmentin. She was made aware of the side effects.

## 2024-05-23 NOTE — PROGRESS NOTES
"Preventive Care Visit  [facility]  Thais Pena MD, Family Medicine  May 29, 2024       SUBJECTIVE:   Ketty is a 28 year old, presenting for the following:  Establish Care and Physical      History of Present Illness       Reason for visit:  Establish care    She eats 2-3 servings of fruits and vegetables daily.She consumes 0 sweetened beverage(s) daily.She exercises with enough effort to increase her heart rate 30 to 60 minutes per day.  She exercises with enough effort to increase her heart rate 5 days per week.   She is taking medications regularly.    Pap due - no prior abnormal  No vaginal concerns.  Monthly menses, normal flow/duration.  Prior IUD - chronic bleeding.  Tracking cycles past year.  Natural family planning.  2 kids, ages 3 and 1.    Considering a 3rd.    Seasonal allergies - flonase/zyretc.    Gardisil vaccine - defers    Labs 11/2023 - fasting -reviewed.  Low iron -had IUD and bleeding at the time.    Migraine   Since your last clinic visit, how have your headaches changed?  No change  How often are you getting headaches or migraines? Headaches daily    Are you able to do normal daily activities when you have a migraine? Yes  Are you taking rescue/relief medications? (Select all that apply) ibuprofen (Advil, Motrin) and Tylenol  How helpful is your rescue/relief medication?  I get some relief  Are you taking any medications to prevent migraines? (Select all that apply)  No  In the past 4 weeks, how often have you gone to urgent care or the emergency room because of your headaches?  0  Have you ever done Advance Care Planning? (For example, a Health Directive, POLST, or a discussion with a medical provider or your loved ones about your wishes): No, advance care planning information given to patient to review.  Patient declined advance care planning discussion at this time.    Daily headaches for \"as long as I can remember\".  Daily Ibuprofen every AM - 200-600.  Typically later " Tylenol prn.  Years duration.  Remote imaging due to headaches.  Sister has same headache condition - seen at [facility] - did accupuncture.  Entired head, behind eyes.  At times vision changes.  Throbbing.  No neurologic symptoms.  Occasional caffeine.  Drinks lots of water.  Remoter trial of Amitriptyline - side effects.      Social History     Tobacco Use    Smoking status: Never     Passive exposure: Never    Smokeless tobacco: Never   Substance Use Topics    Alcohol use: No             5/28/2024     9:43 AM   Alcohol Use   Prescreen: >3 drinks/day or >7 drinks/week? No     Reviewed orders with patient.  Reviewed health maintenance and updated orders accordingly - Yes  Current Outpatient Medications   Medication Sig Dispense Refill    acetaminophen (TYLENOL) 500 MG tablet Take 1,000 mg by mouth      cetirizine (ZYRTEC) 10 MG tablet Take 1 tablet (10 mg) by mouth daily 90 tablet 0    fluticasone (FLONASE) 50 MCG/ACT nasal spray Spray 1 spray into both nostrils daily 16 g 0    ibuprofen (ADVIL/MOTRIN) 800 MG tablet Take 1 tablet (800 mg) by mouth every 8 hours as needed for moderate pain 40 tablet 1    ferrous fumarate 65 mg, [place]. FE,-Vitamin C 125 mg (VITRON C)  MG TABS tablet Take 1 tablet by mouth daily 90 tablet 0    ferrous sulfate (SLO-FE) 142 (45 Fe) MG CR tablet Take 1 tablet (142 mg) by mouth daily 90 tablet 0     No current facility-administered medications for this visit.       Lab work is in process  Labs reviewed in EPIC    Breast Cancer Screening:    [facility] breast cancer - 50 age.      Mammogram Screening - Patient under 40 years of age: Routine Mammogram Screening not recommended.   Pertinent mammograms are reviewed under the imaging tab.      History of abnormal Pap smear: No - age 21-29 PAP every 3 years recommended        3/3/2021     3:44 PM   PAP / HPV   PAP-ABSTRACT See Scanned Document           This result is from an external source.           3/3/2021     3:44 PM   PAP / HPV  "  PAP-ABSTRACT See Scanned Document           This result is from an external source.     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     Review of Systems    Review of Systems  Constitutional, HEENT, cardiovascular, pulmonary, gi and gu systems are negative, except as otherwise noted.    OBJECTIVE:   /78 (BP Location: Right arm, Patient Position: Sitting, Cuff Size: Adult Regular)   Pulse 103   Temp 97.3  F (36.3  C) (Tympanic)   Ht 1.687 m (5' 6.4\")   Wt 65.6 kg (144 lb 9.6 oz)   LMP 05/08/2024 (Approximate)   SpO2 96%   BMI 23.06 kg/m     Estimated body mass index is 23.06 kg/m  as calculated from the following:    Height as of this encounter: 1.687 m (5' 6.4\").    Weight as of this encounter: 65.6 kg (144 lb 9.6 oz).  Physical Exam  GENERAL: alert and no distress  EYES: Eyes grossly normal to inspection, PERRL and conjunctivae and sclerae normal  HENT: ear canals and TM's normal, nose and mouth without ulcers or lesions  NECK: no adenopathy, no asymmetry, masses, or scars  RESP: lungs clear to auscultation - no rales, rhonchi or wheezes  BREAST: normal without masses, tenderness or nipple discharge and no palpable axillary masses or adenopathy  CV: regular rate and rhythm, normal S1 S2, no S3 " or S4, no murmur, click or rub, no peripheral edema  ABDOMEN: soft, nontender, no hepatosplenomegaly, no masses and bowel sounds normal   (female) w/bimanual: normal female external genitalia, normal urethral meatus, normal vaginal mucosa, and normal cervix/adnexa/uterus without masses or discharge   (female): small mole vaginal introitus, brown, even pigment, less than 0.5 cm (per patient no change)  MS: no gross musculoskeletal defects noted, no edema  SKIN: no suspicious lesions or rashes  NEURO: Normal strength and tone, mentation intact and speech normal  PSYCH: mentation appears normal, affect normal/bright    Diagnostic Test Results:  Labs reviewed in Epic  Pap pending    ASSESSMENT/PLAN:   Routine general medical examination at a health care facility  Preventative cares reviewed.  Active with her kids.  Fasting labs 11/2023 reviewed.  Defers Gardisil and covid vaccines.    Cervical cancer screening  - Gynecologic Cytology (PAP); Future    Chronic nonintractable headache, unspecified headache type  Daily headache.  Daily dose of Ibuprofen - concern long term with BP, CKD, etc, as well as rebound headache.  No other red flags.  Prior imaging.  Keep hydrated.  Limit caffeine.  Trial of Inderal LA 60 mg- pulse runs high at baseline anyway.  Counseled on use. Monitor for dizziness, low BP, fatigue.  Reassess in 1-2 months.  - propranolol ER (INDERAL LA) 60 MG 24 hr capsule; Take 1 capsule (60 mg) by mouth daily        Counseling  Reviewed preventive health counseling, as reflected in patient instructions       Regular exercise       Healthy diet/nutrition       Vision screening       Hearing screening       Alcohol Use       Contraception       Family planning       Osteoporosis prevention/bone health        She reports that she has never smoked. She has never been exposed to tobacco smoke. She has never used smokeless tobacco.          Signed Electronically by: Thais Pena MD

## 2024-05-28 SDOH — HEALTH STABILITY: PHYSICAL HEALTH: ON AVERAGE, HOW MANY MINUTES DO YOU ENGAGE IN EXERCISE AT THIS LEVEL?: 40 MIN

## 2024-05-28 SDOH — HEALTH STABILITY: PHYSICAL HEALTH: ON AVERAGE, HOW MANY DAYS PER WEEK DO YOU ENGAGE IN MODERATE TO STRENUOUS EXERCISE (LIKE A BRISK WALK)?: 5 DAYS

## 2024-05-28 ASSESSMENT — SOCIAL DETERMINANTS OF HEALTH (SDOH): HOW OFTEN DO YOU GET TOGETHER WITH FRIENDS OR RELATIVES?: ONCE A WEEK

## 2024-05-29 ENCOUNTER — OFFICE VISIT (OUTPATIENT)
Dept: FAMILY MEDICINE | Facility: OTHER | Age: 29
End: 2024-05-29
Attending: FAMILY MEDICINE
Payer: COMMERCIAL

## 2024-05-29 VITALS
OXYGEN SATURATION: 96 % | DIASTOLIC BLOOD PRESSURE: 78 MMHG | HEART RATE: 103 BPM | WEIGHT: 144.6 LBS | TEMPERATURE: 97.3 F | SYSTOLIC BLOOD PRESSURE: 108 MMHG | HEIGHT: 66 IN | BODY MASS INDEX: 23.24 KG/M2

## 2024-05-29 DIAGNOSIS — R51.9 CHRONIC NONINTRACTABLE HEADACHE, UNSPECIFIED HEADACHE TYPE: ICD-10-CM

## 2024-05-29 DIAGNOSIS — G89.29 CHRONIC NONINTRACTABLE HEADACHE, UNSPECIFIED HEADACHE TYPE: ICD-10-CM

## 2024-05-29 DIAGNOSIS — Z12.4 CERVICAL CANCER SCREENING: ICD-10-CM

## 2024-05-29 DIAGNOSIS — Z00.00 ROUTINE GENERAL MEDICAL EXAMINATION AT A HEALTH CARE FACILITY: Primary | ICD-10-CM

## 2024-05-29 PROCEDURE — 99213 OFFICE O/P EST LOW 20 MIN: CPT | Mod: 25 | Performed by: FAMILY MEDICINE

## 2024-05-29 PROCEDURE — G0123 SCREEN CERV/VAG THIN LAYER: HCPCS | Performed by: FAMILY MEDICINE

## 2024-05-29 PROCEDURE — 99395 PREV VISIT EST AGE 18-39: CPT | Performed by: FAMILY MEDICINE

## 2024-05-29 RX ORDER — PROPRANOLOL HCL 60 MG
60 CAPSULE, EXTENDED RELEASE 24HR ORAL DAILY
Qty: 30 CAPSULE | Refills: 3 | Status: SHIPPED | OUTPATIENT
Start: 2024-05-29 | End: 2024-09-23

## 2024-05-29 ASSESSMENT — PAIN SCALES - GENERAL: PAINLEVEL: NO PAIN (0)

## 2024-05-29 NOTE — PATIENT INSTRUCTIONS
"Preventive Care Advice   This is general advice we often give to help people stay healthy. Your care team may have specific advice just for you. Please talk to your care team about your own preventive care needs.  Lifestyle  Exercise at least 150 minutes each week (30 minutes a day, 5 days a week).  Do muscle strengthening activities 2 days a week. These help control your weight and prevent disease.  No smoking.  Wear sunscreen to prevent skin cancer.  Have your home tested for radon every 2 to 5 years. Radon is a colorless, odorless gas that can harm your lungs. To learn more, go to www.health.[facility].[place]. and search for \"Radon in Homes.\"  Keep guns unloaded and locked up in a safe place like a safe or gun vault, or, use a gun lock and hide the keys. Always lock away bullets separately. To learn more, visit shopp.[place].gov and search for \"safe gun storage.\"  Nutrition  Eat 5 or more servings of fruits and vegetables each day.  Try wheat bread, brown rice and whole grain pasta (instead of white bread, rice, and pasta).  Get enough calcium and vitamin D. Check the label on foods and aim for 100% of the RDA (recommended daily allowance).  Regular exams  Have a dental exam and cleaning every 6 months.  See your health care team every year to talk about:  Any changes in your health.  Any medicines your care team has prescribed.  Preventive care, family planning, and ways to prevent chronic diseases.  Shots (vaccines)   HPV shots (up to age 26), if you've never had them before.  Hepatitis B shots (up to age 59), if you've never had them before.  COVID-19 shot: Get this shot when it's due.  Flu shot: Get a flu shot every year.  Tetanus shot: Get a tetanus shot every 10 years.  Pneumococcal, hepatitis A, and RSV shots: Ask your care team if you need these based on your risk.  Shingles shot (for age 50 and up).  General health tests  Diabetes screening:  Starting at age 35, Get screened for diabetes at least every 3 years.  If " you are younger than age 35, ask your care team if you should be screened for diabetes.  Cholesterol test: At age 39, start having a cholesterol test every 5 years, or more often if advised.  Bone density scan (DEXA): At age 50, ask your care team if you should have this scan for osteoporosis (brittle bones).  Hepatitis C: Get tested at least once in your life.  Abdominal aortic aneurysm screening: Talk to your doctor about having this screening if you:  Have ever smoked; and  Are biologically male; and  Are between the ages of 65 and 75.  STIs (sexually transmitted infections)  Before age 24: Ask your care team if you should be screened for STIs.  After age 24: Get screened for STIs if you're at risk. You are at risk for STIs (including HIV) if:  You are sexually active with more than one person.  You don't use condoms every time.  You or a partner was diagnosed with a sexually transmitted infection.  If you are at risk for HIV, ask about PrEP medicine to prevent HIV.  Get tested for HIV at least once in your life, whether you are at risk for HIV or not.  Cancer screening tests  Cervical cancer screening: If you have a cervix, begin getting regular cervical cancer screening tests at age 21. Most people who have regular screenings with normal results can stop after age 65. Talk about this with your provider.  Breast cancer scan (mammogram): If you've ever had breasts, begin having regular mammograms starting at age 40. This is a scan to check for breast cancer.  Colon cancer screening: It is important to start screening for colon cancer at age 45.  Have a colonoscopy test every 10 years (or more often if you're at risk) Or, ask your provider about stool tests like a FIT test every year or Cologuard test every 3 years.  To learn more about your testing options, visit: www.[facility]/042433.pdf.  For help making a decision, visit: mary/pz43065.  Prostate cancer screening test: If you have a prostate and are age 55  to 69, ask your provider if you would benefit from a yearly prostate cancer screening test.  Lung cancer screening: If you are a current or former smoker age 50 to 80, ask your care team if ongoing lung cancer screenings are right for you.  For informational purposes only. Not to replace the advice of your health care provider. Copyright   2023 [facility]. All rights reserved. Clinically reviewed by the [facility] Transitions Program. [employer] [id number] - REV 04/24.    Trial of Inderal LA 60 mg daily (or nightly) for daily headache prevention.  Monitor for dizziness, low BP, fatigue.  Goal to reduce Ibuprofen use.  Reassess in 1-2 months, sooner if concerns.  If working well, continue at least 3-6 months and then consider going without.  If on higher dose, would taper down.

## 2024-06-03 LAB
BKR LAB AP GYN ADEQUACY: NORMAL
BKR LAB AP GYN INTERPRETATION: NORMAL
BKR LAB AP HPV REFLEX: NORMAL
BKR LAB AP LMP: NORMAL
BKR LAB AP PREVIOUS ABNORMAL: NORMAL
PATH REPORT.COMMENTS IMP SPEC: NORMAL
PATH REPORT.COMMENTS IMP SPEC: NORMAL
PATH REPORT.RELEVANT HX SPEC: NORMAL

## 2024-06-20 ENCOUNTER — MYC MEDICAL ADVICE (OUTPATIENT)
Dept: FAMILY MEDICINE | Facility: OTHER | Age: 29
End: 2024-06-20

## 2024-09-23 DIAGNOSIS — R51.9 CHRONIC NONINTRACTABLE HEADACHE, UNSPECIFIED HEADACHE TYPE: ICD-10-CM

## 2024-09-23 DIAGNOSIS — G89.29 CHRONIC NONINTRACTABLE HEADACHE, UNSPECIFIED HEADACHE TYPE: ICD-10-CM

## 2024-09-23 RX ORDER — PROPRANOLOL HCL 60 MG
60 CAPSULE, EXTENDED RELEASE 24HR ORAL DAILY
Qty: 90 CAPSULE | Refills: 3 | Status: SHIPPED | OUTPATIENT
Start: 2024-09-23

## 2024-09-23 NOTE — TELEPHONE ENCOUNTER
Propranolol  Last Written Prescription Date: 5/29/24  Last Fill Quantity: 30 # of Refills: 3  Last Office Visit: 5/29/24

## 2024-10-05 ENCOUNTER — APPOINTMENT (OUTPATIENT)
Dept: CT IMAGING | Facility: HOSPITAL | Age: 29
End: 2024-10-05
Attending: PHYSICIAN ASSISTANT
Payer: COMMERCIAL

## 2024-10-05 ENCOUNTER — HOSPITAL ENCOUNTER (EMERGENCY)
Facility: HOSPITAL | Age: 29
Discharge: HOME | End: 2024-10-05
Attending: PHYSICIAN ASSISTANT | Admitting: PHYSICIAN ASSISTANT
Payer: COMMERCIAL

## 2024-10-05 VITALS
DIASTOLIC BLOOD PRESSURE: 81 MMHG | OXYGEN SATURATION: 100 % | HEART RATE: 84 BPM | SYSTOLIC BLOOD PRESSURE: 121 MMHG | TEMPERATURE: 97.9 F | RESPIRATION RATE: 18 BRPM

## 2024-10-05 DIAGNOSIS — S09.93XA FACIAL INJURY, INITIAL ENCOUNTER: ICD-10-CM

## 2024-10-05 PROCEDURE — 250N000013 HC RX MED GY IP 250 OP 250 PS 637: Performed by: PHYSICIAN ASSISTANT

## 2024-10-05 PROCEDURE — 70486 CT MAXILLOFACIAL W/O DYE: CPT

## 2024-10-05 PROCEDURE — 99283 EMERGENCY DEPT VISIT LOW MDM: CPT | Performed by: PHYSICIAN ASSISTANT

## 2024-10-05 PROCEDURE — 99284 EMERGENCY DEPT VISIT MOD MDM: CPT | Mod: 25

## 2024-10-05 RX ORDER — IBUPROFEN 600 MG/1
600 TABLET, FILM COATED ORAL ONCE
Status: COMPLETED | OUTPATIENT
Start: 2024-10-05 | End: 2024-10-05

## 2024-10-05 RX ADMIN — IBUPROFEN 600 MG: 600 TABLET, FILM COATED ORAL at 22:10

## 2024-10-05 ASSESSMENT — COLUMBIA-SUICIDE SEVERITY RATING SCALE - C-SSRS
1. IN THE PAST MONTH, HAVE YOU WISHED YOU WERE DEAD OR WISHED YOU COULD GO TO SLEEP AND NOT WAKE UP?: NO
2. HAVE YOU ACTUALLY HAD ANY THOUGHTS OF KILLING YOURSELF IN THE PAST MONTH?: NO
6. HAVE YOU EVER DONE ANYTHING, STARTED TO DO ANYTHING, OR PREPARED TO DO ANYTHING TO END YOUR LIFE?: NO

## 2024-10-05 ASSESSMENT — ACTIVITIES OF DAILY LIVING (ADL): ADLS_ACUITY_SCORE: 35

## 2024-10-06 NOTE — DISCHARGE INSTRUCTIONS
I will call tomorrow with results of your CT scan.    Continue ibuprofen and Tylenol and icing.    Discussed further treatment tomorrow based on CT results.
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
## 2025-01-15 ENCOUNTER — TELEPHONE (OUTPATIENT)
Dept: FAMILY MEDICINE | Facility: OTHER | Age: 30
End: 2025-01-15

## 2025-01-15 DIAGNOSIS — G89.29 CHRONIC NONINTRACTABLE HEADACHE, UNSPECIFIED HEADACHE TYPE: ICD-10-CM

## 2025-01-15 DIAGNOSIS — R51.9 CHRONIC NONINTRACTABLE HEADACHE, UNSPECIFIED HEADACHE TYPE: ICD-10-CM

## 2025-01-15 RX ORDER — PROPRANOLOL HYDROCHLORIDE 60 MG/1
60 CAPSULE, EXTENDED RELEASE ORAL DAILY
Qty: 90 CAPSULE | Refills: 2 | Status: SHIPPED | OUTPATIENT
Start: 2025-01-15

## 2025-03-26 ENCOUNTER — MYC REFILL (OUTPATIENT)
Dept: FAMILY MEDICINE | Facility: OTHER | Age: 30
End: 2025-03-26

## 2025-03-26 DIAGNOSIS — R51.9 CHRONIC NONINTRACTABLE HEADACHE, UNSPECIFIED HEADACHE TYPE: ICD-10-CM

## 2025-03-26 DIAGNOSIS — G89.29 CHRONIC NONINTRACTABLE HEADACHE, UNSPECIFIED HEADACHE TYPE: ICD-10-CM

## 2025-03-26 RX ORDER — PROPRANOLOL HYDROCHLORIDE 60 MG/1
60 CAPSULE, EXTENDED RELEASE ORAL DAILY
Qty: 90 CAPSULE | Refills: 0 | Status: SHIPPED | OUTPATIENT
Start: 2025-03-26

## 2025-03-26 NOTE — TELEPHONE ENCOUNTER
Inderal    - refills on file null and void (Dr. Hart)  Last Written Prescription Date:  12.23.24  Last Fill Quantity: #90,   # refills: 0  Last Office Visit: 5.29.24  Future Office visit:    Next 5 appointments (look out 90 days)      May 30, 2025 8:15 AM  (Arrive by 8:00 AM)  Adult Preventative Visit with Thais Sky MD  [facility] [place] ([facility] [place] ) [address] [place] [address] AVE  [place] [address]  [phone]             Routing refill request to provider for review/approval because:

## 2025-05-15 ENCOUNTER — TELEPHONE (OUTPATIENT)
Dept: OBGYN | Facility: OTHER | Age: 30
End: 2025-05-15

## 2025-05-19 ENCOUNTER — OFFICE VISIT (OUTPATIENT)
Dept: OTOLARYNGOLOGY | Facility: OTHER | Age: 30
End: 2025-05-19
Attending: NURSE PRACTITIONER
Payer: COMMERCIAL

## 2025-05-19 VITALS
HEIGHT: 66 IN | SYSTOLIC BLOOD PRESSURE: 104 MMHG | RESPIRATION RATE: 18 BRPM | TEMPERATURE: 98 F | HEART RATE: 98 BPM | BODY MASS INDEX: 21.69 KG/M2 | OXYGEN SATURATION: 99 % | WEIGHT: 135 LBS | DIASTOLIC BLOOD PRESSURE: 70 MMHG

## 2025-05-19 DIAGNOSIS — J34.2 DEVIATED NASAL SEPTUM: ICD-10-CM

## 2025-05-19 DIAGNOSIS — S09.92XA INJURY OF NOSE, INITIAL ENCOUNTER: ICD-10-CM

## 2025-05-19 DIAGNOSIS — J34.89 NASAL SEPTAL SPUR: ICD-10-CM

## 2025-05-19 DIAGNOSIS — J34.89 NASAL PAIN: Primary | ICD-10-CM

## 2025-05-19 LAB
ABO + RH BLD: NORMAL
BLD GP AB SCN SERPL QL: NEGATIVE
SPECIMEN EXP DATE BLD: NORMAL

## 2025-05-19 ASSESSMENT — PAIN SCALES - GENERAL: PAINLEVEL_OUTOF10: MODERATE PAIN (4)

## 2025-05-19 NOTE — Clinical Note
Please call Ketty: Discussed case briefly with Dr. Carbajal, her recommendation is to talk to OB/GYN regarding possible nasal surgery.  If nasal surgery is recommended, proceed with plain films and we will schedule surgery if positive for fracture.  If nasal surgery is not recommended at this time, avoid films and follow-up for CT sinus and possible septoplasty after delivery  Discussed with veto Holder to proceed with plain films and surgical intervention if necessary.  If not necessary at this time would recommend avoiding surgery and following up with ENT after delivery.  Currently she denies nasal obstruction.  No septal hematoma.  No obvious indications for nasal surgery at this time.  Would recommend following up with ENT for possible septoplasty after delivery if deviated nasal septum and cosmesis is a concern.

## 2025-05-19 NOTE — LETTER
5/19/2025      Ketty Urena  [address] [place] [address]      Dear Colleague,    Thank you for referring your patient, Ketty Urena, to the [facility]. Please see a copy of my visit note below.    Otolaryngology Note         Chief Complaint:     Patient presents with:  Nose Problem: Injury of nose, initial encounter           History of Present Illness:     Ketty Urena is a 29 year old female seen today for nasal injury.  On 5/16/2025 she was taking her son out of bed and he accidentally hit his head into her nose.  She heard a loud crunching sound and it bled for about 20 minutes.  No further bleeding.  She notes asymmetry to the nasal dorsum.  No nasal obstruction.  She is able to smell and taste breathe through her nose well.  She is 8 weeks pregnant and imaging has been deferred at this time.    History of a similar injury back in October 2024.  CT facial bones was completed at that time.  CT facial bones completed 10/5/2024 showed nasal bones intact, nasal septum is deviated to the right with right sided septal spur.  Moderate mucosal thickening seen in the maxillary sinuses bilaterally.  The remaining sinuses are clear.  Bony orbits are intact no mandibular fractures.  This was reviewed with patient in room today.    She is noticing some inferior orbital rim tenderness bilaterally without ecchymosis or visual changes.  No bony step-off noted.         Medications:     Current Outpatient Rx   Medication Sig Dispense Refill     acetaminophen (TYLENOL) 500 MG tablet Take 1,000 mg by mouth       Prenatal MV-Min-Fe Fum-FA-DHA (PRENATAL 1 PO) Take 1 tablet by mouth daily with food.       cetirizine (ZYRTEC) 10 MG tablet Take 1 tablet (10 mg) by mouth daily (Patient not taking: Reported on 5/20/2025) 90 tablet 0     fluticasone (FLONASE) 50 MCG/ACT nasal spray Spray 1 spray into both nostrils daily (Patient not taking: Reported on 5/20/2025) 16 g 0     ibuprofen  "(ADVIL/MOTRIN) 800 MG tablet Take 1 tablet (800 mg) by mouth every 8 hours as needed for moderate pain (Patient not taking: Reported on 5/20/2025) 40 tablet 1     ondansetron (ZOFRAN ODT) 4 MG ODT tab Take 1 tablet (4 mg) by mouth every 6 hours as needed for nausea. 30 tablet 3     propranolol ER (INDERAL LA) 60 MG 24 hr capsule Take 1 capsule (60 mg) by mouth daily. 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     Contact dermatitis     feet     Headache      Keratosis pilaris      Viral warts     feet            Past Surgical History:     Past Surgical History:   Procedure Laterality Date     CYSTOSCOPY N/A 4/18/2018    Procedure: CYSTOSCOPY;;  Surgeon: Timoteo Weber MD;  Location: [facility]     LAPAROSCOPY DIAGNOSTIC (GYN) N/A 4/18/2018    Procedure: LAPAROSCOPY DIAGNOSTIC (GYN);  LAPAROSCOPY, CHROMOPERTUBATION, CYSTOSCOPY;  Surgeon: Timoteo Weber MD;  Location: [facility] OR       ENT family history reviewed         Social History:     Social History     Tobacco Use     Smoking status: Never     Passive exposure: Never     Smokeless tobacco: Never   Vaping Use     Vaping status: Never Used   Substance Use Topics     Alcohol use: No     Drug use: No            Review of Systems:     ROS: See HPI         Physical Exam:     /70 (BP Location: Right arm, Patient Position: Sitting, Cuff Size: Adult Large)   Pulse 98   Temp 98  F (36.7  C) (Tympanic)   Resp 18   Ht 1.676 m (5' 6\")   Wt 61.2 kg (135 lb)   LMP 05/08/2024 (Approximate)   SpO2 99%   BMI 21.79 kg/m      General - The patient is well nourished and well developed, and appears to have good nutritional status.  Alert and oriented to person and place, answers questions and cooperates with examination " appropriately.   Head and Face - Normocephalic and atraumatic, with no gross asymmetry noted.  The facial nerve is intact, with strong symmetric movements.  Voice and Breathing - The patient was breathing comfortably without the use of accessory muscles. There was no wheezing, stridor. The patients voice was clear and strong, and had appropriate pitch and quality.  Ears - External ear normal. Canals are patent. Right tympanic membrane is intact without effusion, retraction or mass. Left tympanic membrane is intact without effusion, retraction or mass.  No hemotympanums was noted.  Eyes - Extraocular movements intact, sclera were not icteric or injected.  Normal EOMs.  Palpation of the orbital rims reveals no crepitus or bony step-offs.  Generalized tenderness.  No periorbital ecchymosis.  Nose -Bony prominence is noted to the right dorsum with the tip skewing slightly to the left.  The skin is intact.  No ecchymosis.  Moderate tenderness to palpation over the nasal dorsum without obvious crepitus or bony step-off.  The nasal cavity was examined with nasal speculum.  The nasal septum is deviated to the right with significant right sided septal spur.  No septal hematoma.  No bleeding.    Limited exam was completed with the rigid nasal scope.  Given her recent positive pregnancy test we opted to avoid spraying her nose with Afrin or lidocaine.  This limited exam.  The septum is deviated right with septal spur.  It does appear intact.  There is no clotting or excoriation to the septum.  No septal hematoma.  Inferior turbinates are moderately enlarged.  Limited exam of the right middle turbinate appears grossly normal, left middle turbinate appears grossly normal.  Nasopharyngeal exam was deferred         Assessment and Plan:       ICD-10-CM    1. Nasal pain  J34.89       2. Injury of nose, initial encounter  S09.92XA lidocaine 2%-oxymetazoline 0.025% nasal solution 2 spray     Adult ENT  Referral      3.  Deviated nasal septum  J34.2       4. Nasal septal spur  J34.89         Discussed case briefly with Dr. Carbajal, her recommendation is to talk to OB/GYN regarding possible nasal surgery.  If nasal surgery is recommended, proceed with plain films and we will schedule surgery if positive for fracture.  If nasal surgery is not recommended at this time, avoid films and follow-up for CT sinus and possible septoplasty after delivery    Discussed with veto Holder to proceed with plain films and surgical intervention if necessary.  If not necessary at this time would recommend avoiding surgery and following up with ENT after delivery.    Currently she denies nasal obstruction.  No septal hematoma.  No obvious indications for nasal surgery at this time.  Would recommend following up with ENT for possible septoplasty after delivery if deviated nasal septum and cosmesis is a concern.    Snehal VASQUEZ  [facility] ENT    Again, thank you for allowing me to participate in the care of your patient.        Sincerely,        Snehal Forbes NP    Electronically signed

## 2025-05-19 NOTE — PATIENT INSTRUCTIONS
Thank you for allowing Snehal Forbes and our ENT team to participate in your care.  If your medications are too expensive, please give the nurse a call.  We can possibly change this medication.  If you have a scheduling or an appointment question please contact our Health Unit Coordinator at their direct line [phone] ext [phone].   ALL nursing questions or concerns can be directed to your ENT nurse at: [phone] - [place]

## 2025-05-20 ENCOUNTER — PRENATAL OFFICE VISIT (OUTPATIENT)
Dept: OBGYN | Facility: OTHER | Age: 30
End: 2025-05-20
Attending: OBSTETRICS & GYNECOLOGY
Payer: COMMERCIAL

## 2025-05-20 ENCOUNTER — RESULTS FOLLOW-UP (OUTPATIENT)
Dept: OBGYN | Facility: OTHER | Age: 30
End: 2025-05-20

## 2025-05-20 VITALS
HEART RATE: 92 BPM | OXYGEN SATURATION: 100 % | DIASTOLIC BLOOD PRESSURE: 78 MMHG | SYSTOLIC BLOOD PRESSURE: 114 MMHG | BODY MASS INDEX: 21.34 KG/M2 | WEIGHT: 132.2 LBS

## 2025-05-20 DIAGNOSIS — O21.9 PREGNANCY RELATED NAUSEA AND VOMITING, ANTEPARTUM: ICD-10-CM

## 2025-05-20 DIAGNOSIS — Z34.91 NORMAL PREGNANCY, FIRST TRIMESTER: Primary | ICD-10-CM

## 2025-05-20 DIAGNOSIS — Z34.91 PREGNANCY WITH UNCERTAIN DATES IN FIRST TRIMESTER: ICD-10-CM

## 2025-05-20 LAB
ALBUMIN UR-MCNC: NEGATIVE MG/DL
AMPHETAMINES UR QL SCN: NORMAL
APPEARANCE UR: CLEAR
BACTERIA #/AREA URNS HPF: ABNORMAL /HPF
BARBITURATES UR QL SCN: NORMAL
BENZODIAZ UR QL SCN: NORMAL
BILIRUB UR QL STRIP: NEGATIVE
BZE UR QL SCN: NORMAL
CANNABINOIDS UR QL SCN: NORMAL
COLOR UR AUTO: YELLOW
ERYTHROCYTE [DISTWIDTH] IN BLOOD BY AUTOMATED COUNT: 12.6 % (ref 10–15)
FENTANYL UR QL: NORMAL
GLUCOSE UR STRIP-MCNC: NEGATIVE MG/DL
HCT VFR BLD AUTO: 41.3 % (ref 35–47)
HGB BLD-MCNC: 13.8 G/DL (ref 11.7–15.7)
HGB UR QL STRIP: NEGATIVE
KETONES UR STRIP-MCNC: NEGATIVE MG/DL
LEUKOCYTE ESTERASE UR QL STRIP: NEGATIVE
MCH RBC QN AUTO: 31.6 PG (ref 26.5–33)
MCHC RBC AUTO-ENTMCNC: 33.4 G/DL (ref 31.5–36.5)
MCV RBC AUTO: 95 FL (ref 78–100)
MUCOUS THREADS #/AREA URNS LPF: PRESENT /LPF
NITRATE UR QL: NEGATIVE
OPIATES UR QL SCN: NORMAL
PCP QUAL URINE (ROCHE): NORMAL
PH UR STRIP: 6.5 [PH] (ref 4.7–8)
PLATELET # BLD AUTO: 203 10E3/UL (ref 150–450)
RBC # BLD AUTO: 4.37 10E6/UL (ref 3.8–5.2)
RBC URINE: <1 /HPF
SP GR UR STRIP: 1.02 (ref 1–1.03)
SQUAMOUS EPITHELIAL: 0 /HPF
UROBILINOGEN UR STRIP-MCNC: NORMAL MG/DL
WBC # BLD AUTO: 9.2 10E3/UL (ref 4–11)
WBC URINE: <1 /HPF

## 2025-05-20 RX ORDER — ONDANSETRON 4 MG/1
4 TABLET, ORALLY DISINTEGRATING ORAL EVERY 6 HOURS PRN
Qty: 30 TABLET | Refills: 3 | Status: SHIPPED | OUTPATIENT
Start: 2025-05-20

## 2025-05-20 ASSESSMENT — PAIN SCALES - GENERAL: PAINLEVEL_OUTOF10: NO PAIN (0)

## 2025-05-20 NOTE — PROGRESS NOTES
Writer reviewed the following education points/handouts with New OB patient: Your Guide To A Health Pregnancy Booklet Provided    [place] Welcome Sheet - Expectation for upcoming appointments and reasons to see your health care provider for regular prenatal care.   Taking Medicine During Pregnancy   [facility] pamphlet   Prenatal Classes Offered   Genetic Testing, Routine Testing, & Ultrasound  Depression and Anxiety Screenings, Signs & Symptoms, Resources, and when to ask for help.   How your Body Changes: (Backache, Round Ligament Pain, Breast Changes, Constipation, Fatigue, Heartburn, Hemorrhoids, Joint Pain, Nausea etc.)  Healthy Diet/Lifestyle Choices   Avoiding Alcohol/Marijuana/Drug Use/Smoking or Vaping page    labor warning signs page   How Babies Grow and Change Scan and Play scooter icons reviewed.   Answered all patients questions. Encourage patient to call back with any other questions and/or concerns. Contact Number Provided.   ANDRAE GAGNON RN

## 2025-05-20 NOTE — PROGRESS NOTES
Have you had or do you currently have:    - Diabetes? n  - Hypertension? Y-with pregnancy  - Heart disease, mitral valve prolapse, or rheumatic fever?  n  - An autoimmune disease such as lupus or rheumatoid arthritis?  n  - Kidney disease, urinary tract infection?  n  - Epilepsy, seizures, or spells?  n  - Migraine headaches?  n  - Any other neurological problems?  n  - Have you ever been treated for depression?  n  - Are you having problems with crying spells or loss of self-esteem?  n  - Have you ever required psychiatric care?  n  - Have you ever had hepatitis, liver disease, or jaundice?  n  - Have you ever been treated for blood clots in your veins, deep vein thrombosis, inflammation in the veins, thrombosis, phelbitis, pulmonary embolism or varicosities?  n  - Have you had excessive bleeding after surgery or dental work?  n  - Do you bleed more than other women after a cut or scratch?  n  - Do you have a history or anemia?  n  - Have you ever had thyroid problems or take thyroid medication?  n  - Do you have any endocrine problems?  n  - Have you every been in a major accident or suffered serious trauma?  n  - Within the last year, has anyone hit, slapped, kicked, or otherwise hurt you?  n  - In the last year, has anyone forced you to have sex when you didn't want to?  n  - Have you every received a blood transfusion?  n  - Would you refuse a blood transfusion if a doctor judged it to be medically necessary?  n  - Does anyone in your home smoke? n  - Do you use tobacco products?  n  - Do you drink beer, wine, or hard liquor?  n  - Do you use any of the following: marijuana, speed, cocaine, heroin, hallucinogens, or other drugs?  n  - Is your blood type RH negative?  yes  - Have you ever had asthma?  n  - Have you ever had tuberculosis?  n  - Do you have any allergies to drugs or over-the-counter medications?  n  - Allergies: dust mites, aspartame, ethanol, venlafaxine hydrochloride, sertraline?  n  - Have you  had any breast problems?  n  - Have you ever breast-fed?  yes  - Have you had any gynecological surgical procedures such as cervical conization, LEEP, laser treatment, cryosurgery of the cervix, or a dilatation and curettage, etc?  y  - Have you ever had any other surgical procedures?  n  - Have you ever had any anesthetic complications?  n  - Have you ever had an abnormal pap smear?  n  - Do you have a history of abnormalities of the uterus? n  - Did your mother take LUÍS or any other hormones when she was pregnant with you?  n  - Did it take you more than one year to become pregnant?  n  - Have you ever been evaluated or treated for infertility?  n  - Is there a history of medical problems in your family, which you feel might adversely affect your health or pregnancy?  n  - Do you have any other problems we have not asked about which you feel may be important to this pregnancy?  n    Symptoms since last menstrual period  - Do you currently have any of the following symptoms: abdominal pain, blood in the stool or urine, chest pain, shortness of breath, coughing or vomiting up blood, you heart is racing or skipping beats, nausea and vomiting, pain on urination, or vaginal discharge or bleeding?  Yes-nausea    Genetic screening  Has the patient, baby's father, or anyone in either family had:  - Thalassemia (Italian, Greek, Mediterranean, or  background only) and an MCV result less than 80?  n  - Neural tube defect such as meningomyelocele, spina bifida, or anencephaly?  n  - Congential heart defect?  n  - Down's syndrome?  n  - Israel-Sachs disease ( [place], [place], [place])?  n  - Sickle cell disease or trait () ?  n  - Hemophilia or other inherited problems of blood?  n  - Muscular dystrophy?  n  - Cystic fibrosis?  n  - Luquillo's chorea?  n  - Mental retardation/autism?  n   If yes, was the person tested for Fragile X?  n  - Any other inherited genetic or chromosomal disorder?  n  - Maternal  metabolic disorder (e.g. insulin- dependent diabetes, PKU)?  n  - A child with birth defects not listed above?  n  - Recurrent pregnancy loss, or a stillbirth?  n  - Has the patient had any medications/street drugs/alcohol since her last menstrual period?  n  - Does the patient or baby's father have any other genetic risk?  n    Infection history  - Have you ever been treated for tuberculosis?  n  - Have you every had a positive skin test for tuberculosis?  n  - Do you live with someone who has tuberculosis?  n  - Have you ever been exposed to tuberculosis?  n  - Do you have genital herpes?  n  - Does your partner have genital herpes?  n  - Have you had a rash or viral illness since your last period?  n  - Have you ever had gonorrhea, chlamydia, syphilis, venereal warts, trichomoniasis, pelvic inflammatory disease, or any other sexually transmitted disease?  n  - Have you had chicken pox?  n  - Have you been vaccinated against chicken pox?  y  - Have you had any other infectious diseases?  n

## 2025-05-20 NOTE — PROGRESS NOTES
HPI:  Ketty Urena is a 29 year old female  Patient's last menstrual period was 2025 (exact date). at Unknown, Estimated Date of Delivery: Data Unavailable.  She denies vaginal bleeding and abdominal pain.   + nausea. No other c/o.    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    Contact dermatitis     feet    Headache     Keratosis pilaris     Viral warts     feet       Past Surgical History:   Procedure Laterality Date    CYSTOSCOPY N/A 2018    Procedure: CYSTOSCOPY;;  Surgeon: Timoteo Weber MD;  Location: [facility]    LAPAROSCOPY DIAGNOSTIC (GYN) N/A 2018    Procedure: LAPAROSCOPY DIAGNOSTIC (GYN);  LAPAROSCOPY, CHROMOPERTUBATION, CYSTOSCOPY;  Surgeon: Timoteo Weber MD;  Location: [facility]       Allergies: Animal dander, Cats, Sulfa antibiotics, and Bactrim [sulfamethoxazole-trimethoprim]     ROS:   Denies fever, wt loss   Neg /GI other than per HPI      EXAM:  Blood pressure 114/78, pulse 92, weight 60 kg (132 lb 3.2 oz), last menstrual period 2025, SpO2 100%.   BMI= Body mass index is 21.34 kg/m .  General - pleasant female in no acute distress.  Abdomen - soft, nontender, nondistended, no hepatosplenomegaly.  Pelvic - EG: normal adult female, BUS: within normal limits,Rectovaginal - deferred.    US:    Transvaginal:Yes  Yolk sac present:Yes  FCA present:Yes  EGA 8w 4d  HARSHAD:Consistent with LMP HARSHAD 25          ASSESSMENT/PLAN:  (Z34.91) Normal pregnancy, first trimester  (primary encounter diagnosis)  Comment: Viable IUP concordant dates.   Plan: HIV Antigen Antibody Combo, Rubella Antibody         IgG, Hepatitis B surface antigen, Treponema Abs        w Reflex to RPR and Titer, Urine Culture, UA         with Microscopic, CBC with platelets, Hepatitis        C antibody, ABO/Rh type and screen, Urine Drug         Screen, [place] [facility] Screening-[place],         [place] Non-Invasive Prenatal Screening-[place]             (O21.9) Pregnancy related nausea and vomiting, antepartum  Comment: Medication R/B/A discussed  Plan: ondansetron (ZOFRAN ODT) 4 MG ODT tab              1st Trimester precautions and testing discussed.  New OB Labs ordered and exam scheduled.  Aneuploidy testing options discussed.  Patient has my card and phone number to call prn if problems in interim.    Timoteo Weber MD

## 2025-05-21 LAB
HBV SURFACE AG SERPL QL IA: NONREACTIVE
HCV AB SERPL QL IA: NONREACTIVE
HIV 1+2 AB+HIV1 P24 AG SERPL QL IA: NONREACTIVE
RUBV IGG SERPL QL IA: 2.19 INDEX
RUBV IGG SERPL QL IA: POSITIVE
T PALLIDUM AB SER QL: NONREACTIVE

## 2025-05-21 NOTE — PROGRESS NOTES
Otolaryngology Note         Chief Complaint:     Patient presents with:  Nose Problem: Injury of nose, initial encounter           History of Present Illness:     Ketty Urena is a 29 year old female seen today for nasal injury.  On 5/16/2025 she was taking her son out of bed and he accidentally hit his head into her nose.  She heard a loud crunching sound and it bled for about 20 minutes.  No further bleeding.  She notes asymmetry to the nasal dorsum.  No nasal obstruction.  She is able to smell and taste breathe through her nose well.  She is 8 weeks pregnant and imaging has been deferred at this time.    History of a similar injury back in October 2024.  CT facial bones was completed at that time.  CT facial bones completed 10/5/2024 showed nasal bones intact, nasal septum is deviated to the right with right sided septal spur.  Moderate mucosal thickening seen in the maxillary sinuses bilaterally.  The remaining sinuses are clear.  Bony orbits are intact no mandibular fractures.  This was reviewed with patient in room today.    She is noticing some inferior orbital rim tenderness bilaterally without ecchymosis or visual changes.  No bony step-off noted.         Medications:     Current Outpatient Rx   Medication Sig Dispense Refill    acetaminophen (TYLENOL) 500 MG tablet Take 1,000 mg by mouth      Prenatal MV-Min-Fe Fum-FA-DHA (PRENATAL 1 PO) Take 1 tablet by mouth daily with food.      cetirizine (ZYRTEC) 10 MG tablet Take 1 tablet (10 mg) by mouth daily (Patient not taking: Reported on 5/20/2025) 90 tablet 0    fluticasone (FLONASE) 50 MCG/ACT nasal spray Spray 1 spray into both nostrils daily (Patient not taking: Reported on 5/20/2025) 16 g 0    ibuprofen (ADVIL/MOTRIN) 800 MG tablet Take 1 tablet (800 mg) by mouth every 8 hours as needed for moderate pain (Patient not taking: Reported on 5/20/2025) 40 tablet 1    ondansetron (ZOFRAN ODT) 4 MG ODT tab Take 1 tablet (4 mg) by mouth every 6 hours as  "needed for nausea. 30 tablet 3    propranolol ER (INDERAL LA) 60 MG 24 hr capsule Take 1 capsule (60 mg) by mouth daily. 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    Contact dermatitis     feet    Headache     Keratosis pilaris     Viral warts     feet            Past Surgical History:     Past Surgical History:   Procedure Laterality Date    CYSTOSCOPY N/A 4/18/2018    Procedure: CYSTOSCOPY;;  Surgeon: Timoteo Weber MD;  Location: [facility]    LAPAROSCOPY DIAGNOSTIC (GYN) N/A 4/18/2018    Procedure: LAPAROSCOPY DIAGNOSTIC (GYN);  LAPAROSCOPY, CHROMOPERTUBATION, CYSTOSCOPY;  Surgeon: Timoteo Weber MD;  Location: [facility] OR       ENT family history reviewed         Social History:     Social History     Tobacco Use    Smoking status: Never     Passive exposure: Never    Smokeless tobacco: Never   Vaping Use    Vaping status: Never Used   Substance Use Topics    Alcohol use: No    Drug use: No            Review of Systems:     ROS: See HPI         Physical Exam:     /70 (BP Location: Right arm, Patient Position: Sitting, Cuff Size: Adult Large)   Pulse 98   Temp 98  F (36.7  C) (Tympanic)   Resp 18   Ht 1.676 m (5' 6\")   Wt 61.2 kg (135 lb)   LMP 05/08/2024 (Approximate)   SpO2 99%   BMI 21.79 kg/m      General - The patient is well nourished and well developed, and appears to have good nutritional status.  Alert and oriented to person and place, answers questions and cooperates with examination appropriately.   Head and Face - Normocephalic and atraumatic, with no gross asymmetry noted.  The facial nerve is intact, with strong symmetric movements.  Voice and Breathing - The patient was breathing comfortably without the use of accessory muscles. There " was no wheezing, stridor. The patients voice was clear and strong, and had appropriate pitch and quality.  Ears - External ear normal. Canals are patent. Right tympanic membrane is intact without effusion, retraction or mass. Left tympanic membrane is intact without effusion, retraction or mass.  No hemotympanums was noted.  Eyes - Extraocular movements intact, sclera were not icteric or injected.  Normal EOMs.  Palpation of the orbital rims reveals no crepitus or bony step-offs.  Generalized tenderness.  No periorbital ecchymosis.  Nose -Bony prominence is noted to the right dorsum with the tip skewing slightly to the left.  The skin is intact.  No ecchymosis.  Moderate tenderness to palpation over the nasal dorsum without obvious crepitus or bony step-off.  The nasal cavity was examined with nasal speculum.  The nasal septum is deviated to the right with significant right sided septal spur.  No septal hematoma.  No bleeding.    Limited exam was completed with the rigid nasal scope.  Given her recent positive pregnancy test we opted to avoid spraying her nose with Afrin or lidocaine.  This limited exam.  The septum is deviated right with septal spur.  It does appear intact.  There is no clotting or excoriation to the septum.  No septal hematoma.  Inferior turbinates are moderately enlarged.  Limited exam of the right middle turbinate appears grossly normal, left middle turbinate appears grossly normal.  Nasopharyngeal exam was deferred         Assessment and Plan:       ICD-10-CM    1. Nasal pain  J34.89       2. Injury of nose, initial encounter  S09.92XA lidocaine 2%-oxymetazoline 0.025% nasal solution 2 spray     Adult ENT  Referral      3. Deviated nasal septum  J34.2       4. Nasal septal spur  J34.89         Discussed case briefly with Dr. Carbajal, her recommendation is to talk to OB/GYN regarding possible nasal surgery.  If nasal surgery is recommended, proceed with plain films and we will  schedule surgery if positive for fracture.  If nasal surgery is not recommended at this time, avoid films and follow-up for CT sinus and possible septoplasty after delivery    Discussed with veto Holder to proceed with plain films and surgical intervention if necessary.  If not necessary at this time would recommend avoiding surgery and following up with ENT after delivery.    Currently she denies nasal obstruction.  No septal hematoma.  No obvious indications for nasal surgery at this time.  Would recommend following up with ENT for possible septoplasty after delivery if deviated nasal septum and cosmesis is a concern.    Snehal Forbes NP-C  [facility]NT

## 2025-05-22 LAB — BACTERIA UR CULT: NORMAL

## 2025-05-29 LAB — SCANNED LAB RESULT: NORMAL

## 2025-05-30 ENCOUNTER — RESULTS FOLLOW-UP (OUTPATIENT)
Dept: FAMILY MEDICINE | Facility: OTHER | Age: 30
End: 2025-05-30

## 2025-05-30 DIAGNOSIS — R79.89 ABNORMAL TSH: Primary | ICD-10-CM

## 2025-06-05 LAB — SCANNED LAB RESULT: NORMAL

## 2025-06-17 ENCOUNTER — PRENATAL OFFICE VISIT (OUTPATIENT)
Dept: OBGYN | Facility: OTHER | Age: 30
End: 2025-06-17
Attending: NURSE PRACTITIONER
Payer: COMMERCIAL

## 2025-06-17 VITALS
HEIGHT: 66 IN | WEIGHT: 134 LBS | BODY MASS INDEX: 21.53 KG/M2 | DIASTOLIC BLOOD PRESSURE: 72 MMHG | SYSTOLIC BLOOD PRESSURE: 100 MMHG

## 2025-06-17 DIAGNOSIS — Z34.81 NORMAL PREGNANCY IN MULTIGRAVIDA IN FIRST TRIMESTER: Primary | ICD-10-CM

## 2025-06-17 LAB
C TRACH DNA SPEC QL PROBE+SIG AMP: NEGATIVE
N GONORRHOEA DNA SPEC QL NAA+PROBE: NEGATIVE
SPECIMEN TYPE: NORMAL

## 2025-06-17 ASSESSMENT — PAIN SCALES - GENERAL: PAINLEVEL_OUTOF10: NO PAIN (0)

## 2025-06-18 ENCOUNTER — RESULTS FOLLOW-UP (OUTPATIENT)
Dept: OBGYN | Facility: OTHER | Age: 30
End: 2025-06-18

## 2025-06-18 NOTE — PROGRESS NOTES
"  HPI:  Ketty Urena is a 30 year old female Patient's last menstrual period was 03/24/2025 (exact date). at 12w2d, Estimated Date of Delivery: Dec 29, 2025.  She denies vaginal bleeding, vomiting, and abdominal pain.   No other c/o.    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    Contact dermatitis     feet    Headache     Keratosis pilaris     Viral warts     feet       Past Surgical History:   Procedure Laterality Date    CYSTOSCOPY N/A 4/18/2018    Procedure: CYSTOSCOPY;;  Surgeon: Timoteo Weber MD;  Location: [facility]    LAPAROSCOPY DIAGNOSTIC (GYN) N/A 4/18/2018    Procedure: LAPAROSCOPY DIAGNOSTIC (GYN);  LAPAROSCOPY, CHROMOPERTUBATION, CYSTOSCOPY;  Surgeon: Timoteo Weber MD;  Location: [facility]       Allergies: Animal dander, Cats, Sulfa antibiotics, and Bactrim [sulfamethoxazole-trimethoprim]     EXAM:  Blood pressure 100/72, height 1.676 m (5' 6\"), weight 60.8 kg (134 lb), last menstrual period 03/24/2025.   BMI= Body mass index is 21.63 kg/m .  General - pleasant female in no acute distress.  Neck - supple without lymphadenopathy or thyromegaly.  Lungs - clear to auscultation bilaterally.  Heart - regular rate and rhythm without murmur.  Abdomen - soft, nontender, nondistended, no hepatosplenomegaly.  Pelvic - EG: normal adult female, BUS: within normal limits, Vagina: well rugated, no discharge, Cervix: no lesions or CMT, closed/long Uterus: gravid, consistant with dates, mobile, Adnexae: no masses or tenderness.  Rectovaginal - deferred.  Musculoskeletal - no gross deformities.  Neurological - normal strength, sensation, and mental status.    Doptones were 150's    ASSESSMENT/PLAN:  (Z34.81) Normal pregnancy in multigravida in first trimester  (primary encounter diagnosis)  Comment:   Plan: GC/Chlamydia by PCR - [facility], Gynecologic         Cytology (PAP)        Return in 4 weeks.       Weight gain and exercise during pregnancy was discussed at today's " visit.  The patient will return to clinic in 4 weeks for continued prenatal care.

## 2025-06-19 ASSESSMENT — ANXIETY QUESTIONNAIRES
3. WORRYING TOO MUCH ABOUT DIFFERENT THINGS: NOT AT ALL
GAD7 TOTAL SCORE: 0
6. BECOMING EASILY ANNOYED OR IRRITABLE: NOT AT ALL
GAD7 TOTAL SCORE: 0
7. FEELING AFRAID AS IF SOMETHING AWFUL MIGHT HAPPEN: NOT AT ALL
1. FEELING NERVOUS, ANXIOUS, OR ON EDGE: NOT AT ALL
2. NOT BEING ABLE TO STOP OR CONTROL WORRYING: NOT AT ALL
5. BEING SO RESTLESS THAT IT IS HARD TO SIT STILL: NOT AT ALL

## 2025-06-19 ASSESSMENT — PATIENT HEALTH QUESTIONNAIRE - PHQ9
SUM OF ALL RESPONSES TO PHQ QUESTIONS 1-9: 0
5. POOR APPETITE OR OVEREATING: NOT AT ALL

## 2025-06-21 ENCOUNTER — HOSPITAL ENCOUNTER (EMERGENCY)
Facility: HOSPITAL | Age: 30
Discharge: HOME | End: 2025-06-21
Payer: COMMERCIAL

## 2025-06-21 VITALS
DIASTOLIC BLOOD PRESSURE: 79 MMHG | SYSTOLIC BLOOD PRESSURE: 114 MMHG | HEART RATE: 123 BPM | WEIGHT: 134 LBS | BODY MASS INDEX: 21.63 KG/M2 | RESPIRATION RATE: 16 BRPM | TEMPERATURE: 98.3 F | OXYGEN SATURATION: 98 %

## 2025-06-21 DIAGNOSIS — O23.41 UTI IN PREGNANCY, FIRST TRIMESTER: ICD-10-CM

## 2025-06-21 LAB
ALBUMIN UR-MCNC: NEGATIVE MG/DL
APPEARANCE UR: CLEAR
BACTERIA #/AREA URNS HPF: ABNORMAL /HPF
BILIRUB UR QL STRIP: NEGATIVE
COLOR UR AUTO: ABNORMAL
GLUCOSE UR STRIP-MCNC: NEGATIVE MG/DL
HGB UR QL STRIP: ABNORMAL
KETONES UR STRIP-MCNC: NEGATIVE MG/DL
LEUKOCYTE ESTERASE UR QL STRIP: ABNORMAL
MUCOUS THREADS #/AREA URNS LPF: PRESENT /LPF
NITRATE UR QL: NEGATIVE
PH UR STRIP: 5.5 [PH] (ref 4.7–8)
RBC URINE: 4 /HPF
SP GR UR STRIP: 1.01 (ref 1–1.03)
SQUAMOUS EPITHELIAL: 0 /HPF
UROBILINOGEN UR STRIP-MCNC: NORMAL MG/DL
WBC URINE: 18 /HPF

## 2025-06-21 PROCEDURE — G0463 HOSPITAL OUTPT CLINIC VISIT: HCPCS

## 2025-06-21 PROCEDURE — 99214 OFFICE O/P EST MOD 30 MIN: CPT

## 2025-06-21 PROCEDURE — 81001 URINALYSIS AUTO W/SCOPE: CPT

## 2025-06-21 PROCEDURE — 87186 SC STD MICRODIL/AGAR DIL: CPT

## 2025-06-21 RX ORDER — CEPHALEXIN 500 MG/1
500 CAPSULE ORAL 2 TIMES DAILY
Qty: 14 CAPSULE | Refills: 0 | Status: SHIPPED | OUTPATIENT
Start: 2025-06-21 | End: 2025-06-28

## 2025-06-21 ASSESSMENT — ENCOUNTER SYMPTOMS
DIARRHEA: 0
ACTIVITY CHANGE: 0
FEVER: 0
FREQUENCY: 1
VOMITING: 0
ABDOMINAL PAIN: 0
FLANK PAIN: 0
NAUSEA: 0
HEMATURIA: 0
APPETITE CHANGE: 0
DYSURIA: 1

## 2025-06-21 ASSESSMENT — ACTIVITIES OF DAILY LIVING (ADL): ADLS_ACUITY_SCORE: 41

## 2025-06-21 NOTE — ED TRIAGE NOTES
Pt presents with urine frequency and pain after urination x1 day. PT denied discharge, concern for std and fever. PT currently pregnant. Pt has not taken any otc medications.

## 2025-06-21 NOTE — DISCHARGE INSTRUCTIONS
Cephalexin twice daily or 7 days. Yogurt or probiotic while taking.   Push fluids.   Follow up in the clinic as needed.   Return with any new or concerning symptoms.

## 2025-06-21 NOTE — ED PROVIDER NOTES
History     Chief Complaint   Patient presents with    Frequency     HPI  Ketty Urena is a 30 year old female who presents to the urgent care with complaints of dysuria and urinary frequency that started today. 12 weeks pregnant. Denies concerns with pregnancy. She also denies abd pain, back pain, fevers, chills, vaginal discharge, vaginal bleeding, and concern for STIs. No recent abx. No significant hx of UTIs.     Allergies:  Allergies   Allergen Reactions    Animal Dander Itching    Cats Itching    Sulfa Antibiotics Itching    Bactrim [Sulfamethoxazole-Trimethoprim] Rash       Problem List:    Patient Active Problem List    Diagnosis Date Noted    History of gestational hypertension 05/04/2023     Priority: Medium    History of severe acute respiratory syndrome coronavirus 2 (SARS-CoV-2) disease 05/04/2023     Priority: Medium    Previous baby with fetal growth restriction 05/04/2023     Priority: Medium    Chronic headache 10/08/2015     Priority: Medium    Interstitial cystitis 02/09/2015     Priority: Medium    Dysmenorrhea 11/04/2011     Priority: Medium        Past Medical History:    Past Medical History:   Diagnosis Date    Benign neoplasm     Contact dermatitis     Headache     Keratosis pilaris     Viral warts        Past Surgical History:    Past Surgical History:   Procedure Laterality Date    CYSTOSCOPY N/A 4/18/2018    Procedure: CYSTOSCOPY;;  Surgeon: Timoteo Weber MD;  Location: [facility]    LAPAROSCOPY DIAGNOSTIC (GYN) N/A 4/18/2018    Procedure: LAPAROSCOPY DIAGNOSTIC (GYN);  LAPAROSCOPY, CHROMOPERTUBATION, CYSTOSCOPY;  Surgeon: Timoteo Weber MD;  Location: [facility]       Family History:    Family History   Problem Relation Age of Onset    Hypertension Mother     Breast Cancer Maternal Grandmother     Diabetes Paternal Grandmother     Osteoporosis Paternal Grandmother     Asthma Son        Social History:  Marital Status:   [2]  Social History     Tobacco Use    Smoking status: Never      Passive exposure: Never    Smokeless tobacco: Never   Vaping Use    Vaping status: Never Used   Substance Use Topics    Alcohol use: No    Drug use: No        Medications:    cephALEXin (KEFLEX) 500 MG capsule  acetaminophen (TYLENOL) 500 MG tablet  cetirizine (ZYRTEC) 10 MG tablet  fluticasone (FLONASE) 50 MCG/ACT nasal spray  ondansetron (ZOFRAN ODT) 4 MG ODT tab  Prenatal MV-Min-Fe Fum-FA-DHA (PRENATAL 1 PO)          Review of Systems   Constitutional:  Negative for activity change, appetite change and fever.   Gastrointestinal:  Negative for abdominal pain, diarrhea, nausea and vomiting.   Genitourinary:  Positive for dysuria and frequency. Negative for flank pain, hematuria, vaginal bleeding and vaginal discharge.   All other systems reviewed and are negative.      Physical Exam   BP: 114/79  Pulse: (!) 123  Temp: 98.3  F (36.8  C)  Resp: 16  Weight: 60.8 kg (134 lb)  SpO2: 98 %      Physical Exam  Vitals and nursing note reviewed.   Constitutional:       General: She is not in acute distress.     Appearance: Normal appearance. She is not ill-appearing or toxic-appearing.   Cardiovascular:      Rate and Rhythm: Regular rhythm. Tachycardia present.      Heart sounds: Normal heart sounds. No murmur heard.  Pulmonary:      Effort: Pulmonary effort is normal.      Breath sounds: Normal breath sounds. No wheezing, rhonchi or rales.   Abdominal:      General: Abdomen is flat.      Palpations: Abdomen is soft.      Tenderness: There is no abdominal tenderness. There is no right CVA tenderness or left CVA tenderness.   Neurological:      Mental Status: She is alert.         ED Course        Procedures       Results for orders placed or performed during the hospital encounter of 06/21/25 (from the past 24 hours)   UA with Microscopic reflex to Culture    Specimen: Urine, Clean Catch   Result Value Ref [facility]    Color Urine Straw Colorless, Straw, Light Yellow, Yellow    Appearance Urine Clear Clear    Glucose Urine  Negative Negative mg/dL    Bilirubin Urine Negative Negative    Ketones Urine Negative Negative mg/dL    Specific Gravity Urine 1.010 1.003 - 1.035    Blood Urine Small (A) Negative    pH Urine 5.5 4.7 - 8.0    Protein Albumin Urine Negative Negative mg/dL    Urobilinogen Urine Normal Normal mg/dL    Nitrite Urine Negative Negative    Leukocyte Esterase Urine Moderate (A) Negative    Bacteria Urine Few (A) None Seen /HPF    Mucus Urine Present (A) None Seen /LPF    RBC Urine 4 (H) <=2 /HPF    WBC Urine 18 (H) <=5 /HPF    Squamous Epithelials Urine 0 <=1 /HPF    Narrative    Urine Culture ordered based on laboratory criteria       Medications - No data to display    Assessments & Plan (with Medical Decision Making)     I have reviewed the nursing notes.    I have reviewed the findings, diagnosis, plan and need for follow up with the patient.  Ketty Urena is a 30 year old female who presents to the urgent care with complaints of dysuria and urinary frequency that started today. 12 weeks pregnant. Denies concerns with pregnancy. She also denies abd pain, back pain, fevers, chills, vaginal discharge, vaginal bleeding, and concern for STIs. No recent abx. No significant hx of UTIs.     MDM: afebrile. Non toxic in appearance with no noted distress. Lungs clear, heart tones regular. Bowel sounds active, abd soft. No CVA tenderness. UA with moderate leuk esterase. Culture in process. Will treat with cephalexin. Supportive measures and return precautions discussed. She is in agreement with plan.     (O23.41) UTI in pregnancy, first trimester  Plan: Cephalexin twice daily or 7 days. Yogurt or probiotic while taking.   Push fluids.   Follow up in the clinic as needed.   Return with any new or concerning symptoms. Understanding verbalized.     Discharge Medication List as of 6/21/2025  1:28 PM        START taking these medications    Details   cephALEXin (KEFLEX) 500 MG capsule Take 1 capsule (500 mg) by mouth 2 times  daily for 7 days., Disp-14 capsule, R-0, E-Prescribe             Final diagnoses:   UTI in pregnancy, first trimester       6/21/2025   [facility]       Cierra Gutierres NP  06/21/25 [id number]

## 2025-06-23 ENCOUNTER — RESULTS FOLLOW-UP (OUTPATIENT)
Dept: EMERGENCY MEDICINE | Facility: HOSPITAL | Age: 30
End: 2025-06-23

## 2025-06-23 LAB
BACTERIA UR CULT: ABNORMAL
BKR LAB AP GYN ADEQUACY: NORMAL
BKR LAB AP GYN INTERPRETATION: NORMAL
BKR LAB AP HPV REFLEX: NORMAL
BKR LAB AP PREVIOUS ABNORMAL: NORMAL
PATH REPORT.COMMENTS IMP SPEC: NORMAL
PATH REPORT.COMMENTS IMP SPEC: NORMAL
PATH REPORT.RELEVANT HX SPEC: NORMAL

## 2025-07-08 ENCOUNTER — OFFICE VISIT (OUTPATIENT)
Dept: FAMILY MEDICINE | Facility: OTHER | Age: 30
End: 2025-07-08
Attending: NURSE PRACTITIONER
Payer: COMMERCIAL

## 2025-07-08 ENCOUNTER — HOSPITAL ENCOUNTER (EMERGENCY)
Facility: HOSPITAL | Age: 30
Discharge: HOME | End: 2025-07-08
Attending: FAMILY MEDICINE
Payer: COMMERCIAL

## 2025-07-08 ENCOUNTER — TELEPHONE (OUTPATIENT)
Dept: FAMILY MEDICINE | Facility: OTHER | Age: 30
End: 2025-07-08

## 2025-07-08 VITALS
RESPIRATION RATE: 22 BRPM | HEART RATE: 186 BPM | OXYGEN SATURATION: 100 % | SYSTOLIC BLOOD PRESSURE: 114 MMHG | TEMPERATURE: 98 F | DIASTOLIC BLOOD PRESSURE: 85 MMHG

## 2025-07-08 VITALS
RESPIRATION RATE: 22 BRPM | SYSTOLIC BLOOD PRESSURE: 106 MMHG | DIASTOLIC BLOOD PRESSURE: 87 MMHG | OXYGEN SATURATION: 100 % | HEART RATE: 114 BPM | TEMPERATURE: 98.1 F

## 2025-07-08 DIAGNOSIS — I47.10 PAROXYSMAL SUPRAVENTRICULAR TACHYCARDIA: ICD-10-CM

## 2025-07-08 DIAGNOSIS — R00.0 TACHYCARDIA: Primary | ICD-10-CM

## 2025-07-08 LAB
ANION GAP SERPL CALCULATED.3IONS-SCNC: 16 MMOL/L (ref 7–15)
BUN SERPL-MCNC: 13.9 MG/DL (ref 6–20)
CALCIUM SERPL-MCNC: 10.2 MG/DL (ref 8.8–10.4)
CHLORIDE SERPL-SCNC: 101 MMOL/L (ref 98–107)
CREAT SERPL-MCNC: 0.51 MG/DL (ref 0.51–0.95)
EGFRCR SERPLBLD CKD-EPI 2021: >90 ML/MIN/1.73M2
GLUCOSE SERPL-MCNC: 108 MG/DL (ref 70–99)
HCO3 SERPL-SCNC: 20 MMOL/L (ref 22–29)
HOLD SPECIMEN: NORMAL
MAGNESIUM SERPL-MCNC: 2 MG/DL (ref 1.7–2.3)
POTASSIUM SERPL-SCNC: 3.8 MMOL/L (ref 3.4–5.3)
SODIUM SERPL-SCNC: 137 MMOL/L (ref 135–145)
T4 FREE SERPL-MCNC: 1.35 NG/DL (ref 0.9–1.7)
TSH SERPL DL<=0.005 MIU/L-ACNC: 0.04 UIU/ML (ref 0.3–4.2)

## 2025-07-08 PROCEDURE — 93010 ELECTROCARDIOGRAM REPORT: CPT | Mod: 76 | Performed by: INTERNAL MEDICINE

## 2025-07-08 PROCEDURE — 96361 HYDRATE IV INFUSION ADD-ON: CPT

## 2025-07-08 PROCEDURE — 36415 COLL VENOUS BLD VENIPUNCTURE: CPT | Performed by: FAMILY MEDICINE

## 2025-07-08 PROCEDURE — 99284 EMERGENCY DEPT VISIT MOD MDM: CPT | Mod: 25

## 2025-07-08 PROCEDURE — 258N000003 HC RX IP 258 OP 636: Performed by: FAMILY MEDICINE

## 2025-07-08 PROCEDURE — 83735 ASSAY OF MAGNESIUM: CPT | Performed by: FAMILY MEDICINE

## 2025-07-08 PROCEDURE — 99284 EMERGENCY DEPT VISIT MOD MDM: CPT | Performed by: FAMILY MEDICINE

## 2025-07-08 PROCEDURE — 84443 ASSAY THYROID STIM HORMONE: CPT | Performed by: FAMILY MEDICINE

## 2025-07-08 PROCEDURE — 93005 ELECTROCARDIOGRAM TRACING: CPT

## 2025-07-08 PROCEDURE — 84439 ASSAY OF FREE THYROXINE: CPT | Performed by: FAMILY MEDICINE

## 2025-07-08 PROCEDURE — 96360 HYDRATION IV INFUSION INIT: CPT

## 2025-07-08 PROCEDURE — 80048 BASIC METABOLIC PNL TOTAL CA: CPT | Performed by: FAMILY MEDICINE

## 2025-07-08 RX ADMIN — SODIUM CHLORIDE 1000 ML: 9 INJECTION, SOLUTION INTRAVENOUS at 12:47

## 2025-07-08 ASSESSMENT — COLUMBIA-SUICIDE SEVERITY RATING SCALE - C-SSRS
2. HAVE YOU ACTUALLY HAD ANY THOUGHTS OF KILLING YOURSELF IN THE PAST MONTH?: NO
6. HAVE YOU EVER DONE ANYTHING, STARTED TO DO ANYTHING, OR PREPARED TO DO ANYTHING TO END YOUR LIFE?: NO
1. IN THE PAST MONTH, HAVE YOU WISHED YOU WERE DEAD OR WISHED YOU COULD GO TO SLEEP AND NOT WAKE UP?: NO

## 2025-07-08 ASSESSMENT — ACTIVITIES OF DAILY LIVING (ADL)
ADLS_ACUITY_SCORE: 41

## 2025-07-08 ASSESSMENT — PAIN SCALES - GENERAL: PAINLEVEL_OUTOF10: NO PAIN (0)

## 2025-07-08 NOTE — ED TRIAGE NOTES
Patient presents with c/o racing heart beat. Was working and felt her heart rate jump. Denies any SOB, Chest pain, generalized c/o pain, denies any vision changes. Reports heart rate did get into 200s. Currently 175 BPM

## 2025-07-08 NOTE — ED PROVIDER NOTES
History     Chief Complaint   Patient presents with    Tachycardia     HPI  Ketty Urena is a 30 year old female  15w1d who ***    Allergies:  Allergies   Allergen Reactions    Animal Dander Itching    Cats Itching    Sulfa Antibiotics Itching    Bactrim [Sulfamethoxazole-Trimethoprim] Rash       Problem List:    Patient Active Problem List    Diagnosis Date Noted    History of gestational hypertension 2023     Priority: Medium    History of severe acute respiratory syndrome coronavirus 2 (SARS-CoV-2) disease 2023     Priority: Medium    Previous baby with fetal growth restriction 2023     Priority: Medium    Chronic headache 10/08/2015     Priority: Medium    Interstitial cystitis 2015     Priority: Medium    Dysmenorrhea 2011     Priority: Medium        Past Medical History:    Past Medical History:   Diagnosis Date    Benign neoplasm     Contact dermatitis     Headache     Keratosis pilaris     Viral warts        Past Surgical History:    Past Surgical History:   Procedure Laterality Date    CYSTOSCOPY N/A 2018    Procedure: CYSTOSCOPY;;  Surgeon: Timoteo Weber MD;  Location: [facility]    LAPAROSCOPY DIAGNOSTIC (GYN) N/A 2018    Procedure: LAPAROSCOPY DIAGNOSTIC (GYN);  LAPAROSCOPY, CHROMOPERTUBATION, CYSTOSCOPY;  Surgeon: Timoteo Weber MD;  Location: [facility]       Family History:    Family History   Problem Relation Age of Onset    Hypertension Mother     Breast Cancer Maternal Grandmother     Diabetes Paternal Grandmother     Osteoporosis Paternal Grandmother     Asthma Son        Social History:  Marital Status:   [2]  Social History     Tobacco Use    Smoking status: Never     Passive exposure: Never    Smokeless tobacco: Never   Vaping Use    Vaping status: Never Used   Substance Use Topics    Alcohol use: No    Drug use: No        Medications:    acetaminophen (TYLENOL) 500 MG tablet  cetirizine (ZYRTEC) 10 MG tablet  fluticasone (FLONASE) 50 MCG/ACT  "nasal spray  ondansetron (ZOFRAN ODT) 4 MG ODT tab  Prenatal MV-Min-Fe Fum-FA-DHA (PRENATAL 1 PO)          Review of Systems    Physical Exam   BP: 123/80  Pulse: (!) 175  Temp: 98.1  F (36.7  C)  Resp: 16  SpO2: 98 %      Physical Exam    ED Course        Procedures         {EKG done?:955438}    Critical Care time:  {none or minutes:479102}  {Trauma Activation or Fall?:569069}  {Sepsis/Septic Shock/Stemi/Stroke:193913::\"None\"}         Recent Results (from the past 24 hours)   EKG 12-lead complete w/read - (Clinic Performed)   Result Value Ref [facility]    Systolic Blood Pressure  mmHg    Diastolic Blood Pressure  mmHg    Ventricular Rate 145 BPM    Atrial Rate  BPM    HI Interval  ms    QRS Duration 88 ms     ms    QTc 441 ms    P Axis  degrees    R AXIS 64 degrees    T Axis 24 degrees    Interpretation ECG       Supraventricular tachycardia  Nonspecific ST abnormality  Abnormal ECG  No previous ECGs available     EKG 12-lead, tracing only   Result Value Ref [facility]    Systolic Blood Pressure  mmHg    Diastolic Blood Pressure  mmHg    Ventricular Rate 116 BPM    Atrial Rate 116 BPM    HI Interval 154 ms    QRS Duration 70 ms     ms    QTc 408 ms    P Axis 68 degrees    R AXIS 57 degrees    T Axis 40 degrees    Interpretation ECG       Sinus tachycardia  Otherwise normal ECG  When compared with ECG of 08-Jul-2025 11:30, (unconfirmed)  No significant change was found     [place] [facility]    Narrative    The following orders were created for panel order [place] [facility].  Procedure                               Abnormality         Status                     ---------                               -----------         ------                     Extra Blue Top Tube[[id number]]                                                        Extra Red Top Tube[[id number]]                                                         Extra Green Top (Lithiu...[[id number]]                                                 Extra Green Top " "(Brandyiu...[[id number]]                                                 Extra Purple Top Tube[[id number]]                                                        Please view results for these tests on the individual orders.       Medications - No data to display    Assessments & Plan (with Medical Decision Making)     I have reviewed the nursing notes.    I have reviewed the findings, diagnosis, plan and need for follow up with the patient.  {ED Addendum:326291::\" \"}        Medical Decision Making  The patient's presentation was of {[facility] Problem:885108}.    The patient's evaluation involved:  {[facility] Data:737986}    The patient's management necessitated {[facility] Management:202627}.        New Prescriptions    No medications on file       Final diagnoses:   None       7/8/2025   [facility]    " findings, diagnosis, plan and need for follow up with the patient.  The patient is a 30-year-old G4, P2 who presents to the emergency department with SVT.  After arriving we had planned on treating this, however, she converted without intervention.  Home, Valsalva maneuvers were reviewed with the patient.  I think she is appropriate for discharge to home.  Close follow-up with OB has been scheduled.  Symptomatic therapies were reviewed.  Reasons to return to the emergency department discussed in detail.        Medical Decision Making  Differential diagnosis: SVT, atrial fibrillation, PAC, PVC, sinus tach, POTS, MVP, cardiomyopathy, psychiatric/stress related, hypothyroidism, menstrual symptoms, and hypoglycemia.      New Prescriptions    No medications on file       Final diagnoses:   Paroxysmal supraventricular tachycardia       7/8/2025   [facility]       Yordan Hawthorne MD  07/12/25 [id number]

## 2025-07-08 NOTE — Clinical Note
Ketty Urena was seen and treated in our emergency department on 7/8/2025.  She may return to work on 07/08/2025.       If you have any questions or concerns, please don't hesitate to call.      Yordan Hawthorne MD

## 2025-07-08 NOTE — PROGRESS NOTES
Assessment & Plan     (R00.0) Tachycardia  (primary encounter diagnosis)  Comment: -209 in the clinic, symptomatic, pregnant  Plan: Patient sent to ER as HR was not decreasing. Report was given.         Curry Hdez is a 30 year old, presenting for the following health issues:  Irregular Heart Beat        7/8/2025    11:39 AM   Additional Questions   Roomed by Hiren Elkins   Accompanied by None         7/8/2025    11:39 AM   Patient Reported Additional Medications   Patient reports taking the following new medications None     HPI      Heart palpitation  Onset/Duration: Chronic/intermittent  Description:   Location: entire chest  Character: Pounding, heavy pressure   Radiation: None   Duration: 5 minutes with heart rate above 209. Patient is still having a heart rate between 140-170 resting    Intensity: moderate  Progression of Symptoms: same and waxing and waning  Accompanying Signs & Symptoms:  Shortness of breath: No  Sweating: YES  Nausea/vomiting: No  Lightheadedness: YES  Palpitations: YES  Fever/Chills: No  Cough: No           Heartburn: No  History:   Family history of heart disease: No  Tobacco use: No  Previous similar symptoms: YES- Yes but EKG has always come out normal. Never happened in clinic setting   Precipitating factors:   Worse with exertion: No  Worse with deep breaths: No           Related to eating: No           Better with burping: No  Alleviating factors: None   Therapies tried and outcome: rest/inactivity    She is pregnant. Was on propranolol for presumed SVT, but it was stopped. Currently not on beta-blocker.       Review of Systems  Constitutional, HEENT, cardiovascular, pulmonary, gi and gu systems are negative, except as otherwise noted.      Objective    /85 (BP Location: Right arm, Patient Position: Sitting, Cuff Size: Adult Regular)   Pulse (!) 186   Temp 98  F (36.7  C) (Tympanic)   Resp 22   LMP 03/24/2025 (Exact Date)   SpO2 100%   There is no  height or weight on file to calculate BMI.  Physical Exam   GENERAL: alert and no distress  CV: regular rhythm, tachycardic, no murmur, click or rub, no peripheral edema  NEURO: Normal strength and tone, mentation intact and speech normal  PSYCH: mentation appears normal, affect normal/bright    EKG; sinus tach,         Signed Electronically by: Joyce Han NP

## 2025-07-09 ENCOUNTER — OFFICE VISIT (OUTPATIENT)
Dept: FAMILY MEDICINE | Facility: OTHER | Age: 30
End: 2025-07-09
Attending: FAMILY MEDICINE
Payer: COMMERCIAL

## 2025-07-09 VITALS
SYSTOLIC BLOOD PRESSURE: 104 MMHG | OXYGEN SATURATION: 96 % | TEMPERATURE: 97.5 F | DIASTOLIC BLOOD PRESSURE: 73 MMHG | RESPIRATION RATE: 14 BRPM | HEART RATE: 112 BPM

## 2025-07-09 DIAGNOSIS — I47.10 PAROXYSMAL SUPRAVENTRICULAR TACHYCARDIA: Primary | ICD-10-CM

## 2025-07-09 LAB
ATRIAL RATE - MUSE: 116 BPM
ATRIAL RATE - MUSE: NORMAL BPM
DIASTOLIC BLOOD PRESSURE - MUSE: NORMAL MMHG
DIASTOLIC BLOOD PRESSURE - MUSE: NORMAL MMHG
INTERPRETATION ECG - MUSE: NORMAL
INTERPRETATION ECG - MUSE: NORMAL
P AXIS - MUSE: 68 DEGREES
P AXIS - MUSE: NORMAL DEGREES
PR INTERVAL - MUSE: 154 MS
PR INTERVAL - MUSE: NORMAL MS
QRS DURATION - MUSE: 70 MS
QRS DURATION - MUSE: 88 MS
QT - MUSE: 284 MS
QT - MUSE: 294 MS
QTC - MUSE: 408 MS
QTC - MUSE: 441 MS
R AXIS - MUSE: 57 DEGREES
R AXIS - MUSE: 64 DEGREES
SYSTOLIC BLOOD PRESSURE - MUSE: NORMAL MMHG
SYSTOLIC BLOOD PRESSURE - MUSE: NORMAL MMHG
T AXIS - MUSE: 24 DEGREES
T AXIS - MUSE: 40 DEGREES
VENTRICULAR RATE- MUSE: 116 BPM
VENTRICULAR RATE- MUSE: 145 BPM

## 2025-07-09 RX ORDER — LABETALOL 100 MG/1
100-200 TABLET, FILM COATED ORAL PRN
Qty: 30 TABLET | Refills: 0 | Status: SHIPPED | OUTPATIENT
Start: 2025-07-09

## 2025-07-09 ASSESSMENT — PAIN SCALES - GENERAL: PAINLEVEL_OUTOF10: NO PAIN (0)

## 2025-07-09 NOTE — PATIENT INSTRUCTIONS
Will update you once I discuss with cardiology and [facility] pharmacy about an as needed beta blocker if episode lasting longer than a couple minutes.    From pharmacist and cardiology - see below.    I sent script of 100 mg so you could start with 100 mg dose but take 2 if needed.    Serafin Plascencia,     Typically we use labetalol in pregnancy. For as needed use, I would recommend 200 mg every 60 minutes up to 3 doses.     Let me know if you have any more questions!   Nikos Almodovar, PharmD   Medication Therapy Management Pharmacist     Maurizio Cartwright, Thais Singer, MD Plascencia,  I do not think there are any medications with pregnancy that do not carry some risk.  That being said, in patients with SVT, there are some AV jennifer blocking agents that are used.  Generally labetalol is the safest but is nonspecific and may not be as effective for SVT.  Metoprolol and pindolol are considered acceptable alternatives.  With beta-blockers, potential fetal risks need to be discussed, in particular the risk of intra-uterine growth restriction.  Generally, avoid atenolol/propranolol/verapamil.  If they are not controlled with AV jennifer blocking agents, flecainide, propafenone, or sotalol can be considered for the prevention of PSVT.    Other option would be digoxin which has been researched heavily and considered generally safe in pregnancy.

## 2025-07-09 NOTE — PROGRESS NOTES
Assessment & Plan     Paroxysmal supraventricular tachycardia  Chronic - but intermittent.    Most recently once per month since being off beta blockade for migraine prevention.  Typically lasts couple minutes, but this one lasted an hour, prompting ER evaluation.  ER note not yet complete -so unable to review.  Did receive IVF.  Is 15 weeks pregnant.  Will CC Dr Cartwright, cardiology, and [facility] pharmacist - regarding pill in pocket type treatment for prn use if episode lasting more than a couple minutes - which beta blocker would be safest in pregnancy - now 2nd trimester.  If formal consult needed - happy to place referrals.    Addendum - from [facility] and cardiology- see below - script sent.    Serafin Plascencia,     Typically we use labetalol in pregnancy. For as needed use, I would recommend 200 mg every 60 minutes up to 3 doses.     Let me know if you have any more questions!   Nikos Almodovar, PharmD   Medication Therapy Management Pharmacist     Maurizio Cartwright, DO  Thais Sky MD Amanda,  I do not think there are any medications with pregnancy that do not carry some risk.  That being said, in patients with SVT, there are some AV jennifer blocking agents that are used.  Generally labetalol is the safest but is nonspecific and may not be as effective for SVT.  Metoprolol and pindolol are considered acceptable alternatives.  With beta-blockers, potential fetal risks need to be discussed, in particular the risk of intra-uterine growth restriction.  Generally, avoid atenolol/propranolol/verapamil.  If they are not controlled with AV jennifer blocking agents, flecainide, propafenone, or sotalol can be considered for the prevention of PSVT.    Other option would be digoxin which has been researched heavily and considered generally safe in pregnancy.    (I47.10) Paroxysmal supraventricular tachycardia  (primary encounter diagnosis)  Plan: labetalol (NORMODYNE) 100 MG tablet        MED REC REQUIRED  Post Medication  Reconciliation Status: discharge medications reconciled, continue medications without change      Subjective   Ketty is a 30 year old, presenting for the following health issues:  ER F/U        7/9/2025     8:13 AM   Additional Questions   Roomed by Hiren Elkins   Accompanied by None         7/9/2025     8:13 AM   Patient Reported Additional Medications   Patient reports taking the following new medications None     HPI      ED/UC Followup:  Facility:   [facility]  Date of visit: 7/8/25  Reason for visit: SVT    Note not finished to review from ER.  TSH was suppressed 0.04, but T4 normal.  Normal Mg, BMP.    Current Status: Patient states that she is feeling better today. She states that he pulse stayed in the low 120 throughout the evening.     Typically lasts few min - this time it was an hour.  Was sitting at desk at work.   200s rate.  Could feel neck pulsing.  Stress - work - under staffed.  That was day prior.  Hydrating well.  15 weeks along.  No spotting.  No falls.  Given IVF fluids in ER.     Monitor in 2023.  Didn't have symptoms during it.    Last episode in May - 3 min.  1 in beginning of June - short.  Then this one.    MTM - note sent  Chay  note sent    No prior prn beta blocker.  Was on propranolol prior for headaches only - didn't have any symptoms during that time.         Review of Systems  Constitutional, HEENT, cardiovascular, pulmonary, gi and gu systems are negative, except as otherwise noted.      Objective    /73 (BP Location: Left arm, Patient Position: Sitting, Cuff Size: Adult Regular)   Pulse 112   Temp 97.5  F (36.4  C) (Tympanic)   Resp 14   LMP 03/24/2025 (Exact Date)   SpO2 96%   There is no height or weight on file to calculate BMI.  Physical Exam   GENERAL: alert and no distress  NECK: no adenopathy, no asymmetry, masses, or scars  RESP: lungs clear to auscultation - no rales, rhonchi or wheezes  CV: regular rate and rhythm, normal S1 S2, no S3 or S4, no murmur,  click or rub, no peripheral edema  MS: no gross musculoskeletal defects noted, no edema  PSYCH: mentation appears normal, affect normal/bright  The longitudinal plan of care for the diagnosis(es)/condition(s) as documented were addressed during this visit. Due to the added complexity in care, I will continue to support Ketty in the subsequent management and with ongoing continuity of care.        Signed Electronically by: Thais Pena MD

## 2025-07-09 NOTE — Clinical Note
Inquiring about safe prn beta blocker use in pregnancy - see note - 2nd trimester; svt; once per month for couple min but last episode lasted an hour up to 200 bpm - prompting ER visit.   Appreciate input - if formal consult needed - happy to place that.  Had monitor 2 years ago - wasn't symptomatic during placement. Thank you.

## 2025-07-12 ASSESSMENT — ENCOUNTER SYMPTOMS
SORE THROAT: 0
VOMITING: 0
COUGH: 0
APPETITE CHANGE: 0
NECK STIFFNESS: 0
RHINORRHEA: 0
FEVER: 0
MYALGIAS: 0
ARTHRALGIAS: 0
HEADACHES: 0
FATIGUE: 0
EYE REDNESS: 0
DYSURIA: 0
CHILLS: 0
ABDOMINAL PAIN: 0
DIARRHEA: 0
ACTIVITY CHANGE: 0
HEMATURIA: 0
SHORTNESS OF BREATH: 0
DIZZINESS: 0
PALPITATIONS: 1
NAUSEA: 0

## 2025-07-15 ENCOUNTER — PRENATAL OFFICE VISIT (OUTPATIENT)
Dept: OBGYN | Facility: OTHER | Age: 30
End: 2025-07-15
Attending: NURSE PRACTITIONER
Payer: COMMERCIAL

## 2025-07-15 VITALS — WEIGHT: 135 LBS | BODY MASS INDEX: 21.79 KG/M2 | DIASTOLIC BLOOD PRESSURE: 68 MMHG | SYSTOLIC BLOOD PRESSURE: 100 MMHG

## 2025-07-15 DIAGNOSIS — Z34.92 NORMAL PREGNANCY, SECOND TRIMESTER: Primary | ICD-10-CM

## 2025-07-15 ASSESSMENT — PAIN SCALES - GENERAL: PAINLEVEL_OUTOF10: NO PAIN (0)

## 2025-07-15 NOTE — PROGRESS NOTES
Recent episode of SVT and seen in ED with follow up with Dr Sky.  Rate up to 200's.  Has Rx for prn labetalol and has not needed to use it.  See notes.  Denies cramping, vb, n/v.  Feeling movement. Discussed and scheduled 20 week anatomy screen US.  Return in 4 weeks ;

## 2025-08-08 ENCOUNTER — HOSPITAL ENCOUNTER (OUTPATIENT)
Dept: ULTRASOUND IMAGING | Facility: HOSPITAL | Age: 30
Discharge: HOME | End: 2025-08-08
Attending: NURSE PRACTITIONER
Payer: COMMERCIAL

## 2025-08-08 DIAGNOSIS — Z34.81 NORMAL PREGNANCY IN MULTIGRAVIDA IN FIRST TRIMESTER: ICD-10-CM

## 2025-08-08 PROCEDURE — 76805 OB US >/= 14 WKS SNGL FETUS: CPT | Mod: 26 | Performed by: STUDENT IN AN ORGANIZED HEALTH CARE EDUCATION/TRAINING PROGRAM

## 2025-08-08 PROCEDURE — 76805 OB US >/= 14 WKS SNGL FETUS: CPT

## 2025-08-25 ENCOUNTER — HOSPITAL ENCOUNTER (OUTPATIENT)
Dept: ULTRASOUND IMAGING | Facility: HOSPITAL | Age: 30
Discharge: HOME | End: 2025-08-25
Attending: NURSE PRACTITIONER | Admitting: RADIOLOGY
Payer: COMMERCIAL

## 2025-08-25 DIAGNOSIS — Z34.92 NORMAL PREGNANCY, SECOND TRIMESTER: ICD-10-CM

## 2025-08-25 PROCEDURE — 76816 OB US FOLLOW-UP PER FETUS: CPT

## 2025-08-25 PROCEDURE — 76816 OB US FOLLOW-UP PER FETUS: CPT | Mod: 26 | Performed by: RADIOLOGY

## (undated) DEVICE — LUBRICANT JELLY 2OZ. TUBE

## (undated) DEVICE — TOPICAL SKIN ADHESIVE EXOFIN

## (undated) DEVICE — UTERINE MANIPULATOR-KRONNER MANIPUJECTOR

## (undated) DEVICE — IRRIGATION-NACL 3000ML (BAG)

## (undated) DEVICE — PUNCTURE CLOSURE DEVICE

## (undated) DEVICE — TUBING-SUCTION 20FT

## (undated) DEVICE — PRESSURE INFUSOR DISP. 3000CC

## (undated) DEVICE — GLV-7.0 PROTEXIS PI CLASSIC LF/PF

## (undated) DEVICE — SET-TUR Y-TYPE BLADDER IRRIGATION

## (undated) DEVICE — IRRIGATION-H2O 1000ML

## (undated) DEVICE — BIN-UROLOGY / CYSTO

## (undated) DEVICE — NDL-INSUFFLATION 120MM

## (undated) DEVICE — PACK-GYN CYSTO-CUSTOM

## (undated) DEVICE — APPLICATOR-CHLORAPREP 26ML TINTED CHG 2%+ 70% IPA-SURGICAL

## (undated) DEVICE — CANISTER-SUCTION 2000CC

## (undated) DEVICE — TUBING-INSUFFLATION/LAPAROFLATOR W/FILTER

## (undated) DEVICE — COVER-TABLE SHEET

## (undated) DEVICE — GLV-8.5 BIOGEL LATEX

## (undated) DEVICE — TRAY-SKIN PREP POVIDONE/IODINE

## (undated) DEVICE — DRAPE-STERI 45X60CM #1010

## (undated) DEVICE — CATH-URETHRAL 14FR

## (undated) DEVICE — PACK-LAP LAVH-CUSTOM

## (undated) DEVICE — BLANKET-BAIR UPPER BODY

## (undated) DEVICE — TROCAR-5X100MM BLADED W/FIXATION

## (undated) DEVICE — LABEL-STERILE PREPRINTED FOR OR

## (undated) DEVICE — LIGASURE-5MM BLUNT TIP LAPAROSCOPIC

## (undated) DEVICE — DRSG-SPONGE X-RAY 4 X 4

## (undated) DEVICE — SCD SLEEVE-KNEE REG.

## (undated) DEVICE — SPONGE-LAPAROTOMY PADS 18 X 18

## (undated) DEVICE — LIGHT HANDLE COVER

## (undated) DEVICE — BLADE-SURG CLIPPER

## (undated) DEVICE — Device

## (undated) RX ORDER — GLYCOPYRROLATE 0.2 MG/ML
INJECTION, SOLUTION INTRAMUSCULAR; INTRAVENOUS
Status: DISPENSED
Start: 2018-04-18

## (undated) RX ORDER — LIDOCAINE HYDROCHLORIDE 20 MG/ML
INJECTION, SOLUTION EPIDURAL; INFILTRATION; INTRACAUDAL; PERINEURAL
Status: DISPENSED
Start: 2018-04-18

## (undated) RX ORDER — FENTANYL CITRATE 50 UG/ML
INJECTION, SOLUTION INTRAMUSCULAR; INTRAVENOUS
Status: DISPENSED
Start: 2018-04-18

## (undated) RX ORDER — DEXAMETHASONE SODIUM PHOSPHATE 10 MG/ML
INJECTION, SOLUTION INTRAMUSCULAR; INTRAVENOUS
Status: DISPENSED
Start: 2018-04-18

## (undated) RX ORDER — PROPOFOL 10 MG/ML
INJECTION, EMULSION INTRAVENOUS
Status: DISPENSED
Start: 2018-04-18

## (undated) RX ORDER — ONDANSETRON 2 MG/ML
INJECTION INTRAMUSCULAR; INTRAVENOUS
Status: DISPENSED
Start: 2018-04-18